# Patient Record
Sex: FEMALE | Race: WHITE | NOT HISPANIC OR LATINO
[De-identification: names, ages, dates, MRNs, and addresses within clinical notes are randomized per-mention and may not be internally consistent; named-entity substitution may affect disease eponyms.]

---

## 2017-11-27 ENCOUNTER — TRANSCRIPTION ENCOUNTER (OUTPATIENT)
Age: 27
End: 2017-11-27

## 2020-01-29 ENCOUNTER — TRANSCRIPTION ENCOUNTER (OUTPATIENT)
Age: 30
End: 2020-01-29

## 2021-03-23 ENCOUNTER — APPOINTMENT (OUTPATIENT)
Dept: NEPHROLOGY | Facility: CLINIC | Age: 31
End: 2021-03-23
Payer: COMMERCIAL

## 2021-03-23 ENCOUNTER — NON-APPOINTMENT (OUTPATIENT)
Age: 31
End: 2021-03-23

## 2021-03-23 ENCOUNTER — TRANSCRIPTION ENCOUNTER (OUTPATIENT)
Age: 31
End: 2021-03-23

## 2021-03-23 ENCOUNTER — LABORATORY RESULT (OUTPATIENT)
Age: 31
End: 2021-03-23

## 2021-03-23 VITALS — HEART RATE: 69 BPM | SYSTOLIC BLOOD PRESSURE: 111 MMHG | DIASTOLIC BLOOD PRESSURE: 76 MMHG

## 2021-03-23 VITALS — TEMPERATURE: 99.8 F | WEIGHT: 140 LBS

## 2021-03-23 DIAGNOSIS — F17.200 NICOTINE DEPENDENCE, UNSPECIFIED, UNCOMPLICATED: ICD-10-CM

## 2021-03-23 DIAGNOSIS — E04.1 NONTOXIC SINGLE THYROID NODULE: ICD-10-CM

## 2021-03-23 DIAGNOSIS — Z00.00 ENCOUNTER FOR GENERAL ADULT MEDICAL EXAMINATION W/OUT ABNORMAL FINDINGS: ICD-10-CM

## 2021-03-23 PROCEDURE — 99204 OFFICE O/P NEW MOD 45 MIN: CPT

## 2021-03-23 PROCEDURE — 99072 ADDL SUPL MATRL&STAF TM PHE: CPT

## 2021-03-23 NOTE — PHYSICAL EXAM
[General Appearance - Alert] : alert [General Appearance - In No Acute Distress] : in no acute distress [Outer Ear] : the ears and nose were normal in appearance [Oropharynx] : the oropharynx was normal [Auscultation Breath Sounds / Voice Sounds] : lungs were clear to auscultation bilaterally [Heart Rate And Rhythm] : heart rate was normal and rhythm regular [Heart Sounds] : normal S1 and S2 [Heart Sounds Gallop] : no gallops [Murmurs] : no murmurs [Heart Sounds Pericardial Friction Rub] : no pericardial rub [Edema] : there was no peripheral edema [Bowel Sounds] : normal bowel sounds [Abdomen Soft] : soft [Abdomen Tenderness] : non-tender [] : no hepato-splenomegaly [Abdomen Mass (___ Cm)] : no abdominal mass palpated [Cervical Lymph Nodes Enlarged Posterior Bilaterally] : posterior cervical [Cervical Lymph Nodes Enlarged Anterior Bilaterally] : anterior cervical [Supraclavicular Lymph Nodes Enlarged Bilaterally] : supraclavicular
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
## 2021-03-23 NOTE — ASSESSMENT
[FreeTextEntry1] : # Hematuria and proteinuria with FH of AD Alport syndrome.\par * Recheck U/A, urine protein.\par * [employer] progressive renal disease panel (18 genes) ordered. Written informed consent obtained ([email or web address]).\par * Follow up in 4 months.\par \par # Smoking.\par * Quit smoking. Counseling given.

## 2021-03-27 LAB
APPEARANCE: CLEAR
BILIRUBIN URINE: NEGATIVE
BLOOD URINE: ABNORMAL
COLOR: COLORLESS
CREAT SPEC-SCNC: 29 MG/DL
CREAT SPEC-SCNC: 29 MG/DL
CREAT/PROT UR: 0.4 RATIO
GLUCOSE QUALITATIVE U: NEGATIVE
KETONES URINE: NEGATIVE
LEUKOCYTE ESTERASE URINE: NEGATIVE
MICROALBUMIN 24H UR DL<=1MG/L-MCNC: 3.9 MG/DL
MICROALBUMIN/CREAT 24H UR-RTO: 136 MG/G
NITRITE URINE: NEGATIVE
PH URINE: 7.5
PROT UR-MCNC: 12 MG/DL
PROTEIN URINE: NEGATIVE
SPECIFIC GRAVITY URINE: 1
UROBILINOGEN URINE: NORMAL

## 2021-05-19 ENCOUNTER — LABORATORY RESULT (OUTPATIENT)
Age: 31
End: 2021-05-19

## 2021-05-19 ENCOUNTER — APPOINTMENT (OUTPATIENT)
Dept: NEPHROLOGY | Facility: CLINIC | Age: 31
End: 2021-05-19
Payer: COMMERCIAL

## 2021-05-19 VITALS — SYSTOLIC BLOOD PRESSURE: 121 MMHG | DIASTOLIC BLOOD PRESSURE: 81 MMHG | HEART RATE: 68 BPM

## 2021-05-19 DIAGNOSIS — R31.9 HEMATURIA, UNSPECIFIED: ICD-10-CM

## 2021-05-19 PROCEDURE — 36415 COLL VENOUS BLD VENIPUNCTURE: CPT

## 2021-05-19 PROCEDURE — 99214 OFFICE O/P EST MOD 30 MIN: CPT | Mod: 25

## 2021-05-19 PROCEDURE — 99072 ADDL SUPL MATRL&STAF TM PHE: CPT
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
## 2021-05-19 NOTE — ASSESSMENT
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 \par * Therapies for kidney disease: blood pressure control; other evidence-based therapies including exercise, a plant-based lower oxalate diet, and 400 mcg folic acid daily\par * Cardiovascular disease prevention: counseling on healthy diet, physical activity, weight loss, alcohol limitation, blood pressure control\par * The patient has been counseled that chronic kidney disease is a significant condition and regular office followup with me (at least every 6 months for now) is important for monitoring and their health, and that it is their responsibility to make a follow up appointment.\par * The patient has been counseled never to stop taking their medications without discussing it with me or another doctor.\par * The patient has been counseled on avoiding NSAIDs.\par * The patient has been counseled on risk of acute renal failure and instructed to immediately call and speak with me or go immediately to ER with any severe symptoms, nausea, vomiting, diarrhea, chest pain, or shortness of breath.\par * A counseling information sheet has been given (today or previously). All their questions were answered.\par \par # Albuminuria.\par * Recheck urine protein. \par \par # Borderline BP. \par * The patient's blood pressure was checked with the [employer] HEM-907XL using the SPRINT trial protocol after sitting quietly in an empty room with arm supported, back supported, and feet on the floor for 5 minutes. The average of 3 readings were taken.\par * A counseling information sheet has been given (currently or previously, in-person or electronically). All their questions were answered.\par * The patient has been counseled to check their BP at home with an automatic arm cuff, write down the readings, and reach me directly on the phone immediately if they are persistently > 180 systolic or if SBP is less than 100 or if lightheadedness develops. They were counseled to bring in all blood pressure readings and medications next visit.\par * The patient has been counseled that regular office followup (at least every 4 - 6 months for now)  is important for monitoring and for their health, and that it is their responsibility to make follow up appointments.\par * The patient also has been counseled that they must never stop or change any medications without discussing this with me (or another physician). \par

## 2021-05-22 LAB
25(OH)D3 SERPL-MCNC: 17.2 NG/ML
ALBUMIN SERPL ELPH-MCNC: 5.2 G/DL
ALP BLD-CCNC: 43 U/L
ALT SERPL-CCNC: 8 U/L
ANION GAP SERPL CALC-SCNC: 15 MMOL/L
APPEARANCE: CLEAR
AST SERPL-CCNC: 18 U/L
BASOPHILS # BLD AUTO: 0.03 K/UL
BASOPHILS NFR BLD AUTO: 0.4 %
BILIRUB SERPL-MCNC: 0.3 MG/DL
BILIRUBIN URINE: NEGATIVE
BLOOD URINE: ABNORMAL
BUN SERPL-MCNC: 8 MG/DL
C TRACH RRNA SPEC QL NAA+PROBE: NOT DETECTED
CALCIUM SERPL-MCNC: 9.9 MG/DL
CHLORIDE SERPL-SCNC: 102 MMOL/L
CHOLEST SERPL-MCNC: 243 MG/DL
CO2 SERPL-SCNC: 22 MMOL/L
COLOR: COLORLESS
CREAT SERPL-MCNC: 0.52 MG/DL
CREAT SPEC-SCNC: 22 MG/DL
CYSTATIN C SERPL-MCNC: 0.72 MG/L
EOSINOPHIL # BLD AUTO: 0.07 K/UL
EOSINOPHIL NFR BLD AUTO: 0.8 %
ESTIMATED AVERAGE GLUCOSE: 105 MG/DL
FERRITIN SERPL-MCNC: 102 NG/ML
GFR/BSA.PRED SERPLBLD CYS-BASED-ARV: 117 ML/MIN
GLUCOSE QUALITATIVE U: NEGATIVE
GLUCOSE SERPL-MCNC: 83 MG/DL
HBA1C MFR BLD HPLC: 5.3 %
HBV SURFACE AG SER QL: NONREACTIVE
HCT VFR BLD CALC: 38.3 %
HCV AB SER QL: NONREACTIVE
HCV S/CO RATIO: 0.13 S/CO
HDLC SERPL-MCNC: 82 MG/DL
HGB BLD-MCNC: 12.3 G/DL
HIV1+2 AB SPEC QL IA.RAPID: NONREACTIVE
IMM GRANULOCYTES NFR BLD AUTO: 0.2 %
KETONES URINE: NEGATIVE
LDLC SERPL CALC-MCNC: 141 MG/DL
LEUKOCYTE ESTERASE URINE: NEGATIVE
LYMPHOCYTES # BLD AUTO: 3.35 K/UL
LYMPHOCYTES NFR BLD AUTO: 40.4 %
M TB IFN-G BLD-IMP: NEGATIVE
MAN DIFF?: NORMAL
MCHC RBC-ENTMCNC: 29.4 PG
MCHC RBC-ENTMCNC: 32.1 GM/DL
MCV RBC AUTO: 91.6 FL
MICROALBUMIN 24H UR DL<=1MG/L-MCNC: 3.9 MG/DL
MICROALBUMIN/CREAT 24H UR-RTO: 179 MG/G
MONOCYTES # BLD AUTO: 0.83 K/UL
MONOCYTES NFR BLD AUTO: 10 %
N GONORRHOEA RRNA SPEC QL NAA+PROBE: NOT DETECTED
NEUTROPHILS # BLD AUTO: 4 K/UL
NEUTROPHILS NFR BLD AUTO: 48.2 %
NITRITE URINE: NEGATIVE
NONHDLC SERPL-MCNC: 161 MG/DL
PH URINE: 6.5
PLATELET # BLD AUTO: 280 K/UL
POTASSIUM SERPL-SCNC: 3.9 MMOL/L
PROT SERPL-MCNC: 7.6 G/DL
PROTEIN URINE: NEGATIVE
QUANTIFERON TB PLUS MITOGEN MINUS NIL: 9.5 IU/ML
QUANTIFERON TB PLUS NIL: 0.05 IU/ML
QUANTIFERON TB PLUS TB1 MINUS NIL: 0 IU/ML
QUANTIFERON TB PLUS TB2 MINUS NIL: 0 IU/ML
RBC # BLD: 4.18 M/UL
RBC # FLD: 12.8 %
SODIUM SERPL-SCNC: 139 MMOL/L
SOURCE AMPLIFICATION: NORMAL
SPECIFIC GRAVITY URINE: 1
T PALLIDUM AB SER QL IA: NEGATIVE
THYROPEROXIDASE AB SERPL IA-ACNC: 1593 IU/ML
TRIGL SERPL-MCNC: 98 MG/DL
TSH SERPL-ACNC: 0.76 UIU/ML
UROBILINOGEN URINE: NORMAL
VIT B12 SERPL-MCNC: 499 PG/ML
WBC # FLD AUTO: 8.3 K/UL

## 2021-06-18 ENCOUNTER — APPOINTMENT (OUTPATIENT)
Dept: NEPHROLOGY | Facility: CLINIC | Age: 31
End: 2021-06-18

## 2024-03-06 ENCOUNTER — APPOINTMENT (OUTPATIENT)
Dept: NEPHROLOGY | Facility: CLINIC | Age: 34
End: 2024-03-06
Payer: COMMERCIAL

## 2024-03-06 VITALS — WEIGHT: 152 LBS

## 2024-03-06 VITALS — SYSTOLIC BLOOD PRESSURE: 103 MMHG | HEART RATE: 78 BPM | DIASTOLIC BLOOD PRESSURE: 71 MMHG

## 2024-03-06 DIAGNOSIS — E06.9 THYROIDITIS, UNSPECIFIED: ICD-10-CM

## 2024-03-06 DIAGNOSIS — Z3A.15 15 WEEKS GESTATION OF PREGNANCY: ICD-10-CM

## 2024-03-06 DIAGNOSIS — R03.0 ELEVATED BLOOD-PRESSURE READING, W/OUT DIAGNOSIS OF HYPERTENSION: ICD-10-CM

## 2024-03-06 DIAGNOSIS — R80.9 PROTEINURIA, UNSPECIFIED: ICD-10-CM

## 2024-03-06 PROCEDURE — 36415 COLL VENOUS BLD VENIPUNCTURE: CPT

## 2024-03-06 PROCEDURE — 99214 OFFICE O/P EST MOD 30 MIN: CPT | Mod: 25
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
## 2024-03-06 NOTE — ASSESSMENT
[FreeTextEntry1] : # 15 weeks gestation. * High risk ob referral made and recommended. They were instructed that this referral is important for their health and that it is their responsibility to make and keep this appointment. All their questions were answered.   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 * Reheck labs. * Will consider SGLT2i in future.  * Therapies for kidney disease: blood pressure control; other evidence-based therapies recommended including exercise, a plant-based lower oxalate diet, and 400 mcg folic acid daily * Cardiovascular disease prevention: counseling on healthy diet, physical activity, weight loss, alcohol limitation, blood pressure control * A counseling information sheet on CKD has been given (which they have been instructed to read). * The patient has been counseled that chronic kidney disease is a significant condition and regular office follow-up with me (at least every 1-2  months for now) is important for monitoring and their health, and that it is their responsibility to make a follow-up appointment. * The patient has been counseled never to stop taking their medications without discussing it with me or another doctor. * The patient has been counseled on avoiding NSAIDs. * The patient has been counseled on risk of worsening kidney function and instructed to immediately call and speak with me and go immediately to ER with any severe symptoms, nausea, vomiting, diarrhea, chest pain, or shortness of breath.  # Albuminuria. * Recheck urine protein.   # Borderline BP now controlled.  * Couseled to check BP and to immediately contact me/ob and go to ER with any sx of preeclampsia, elevated BP > 130 - 140s, HA, blurred vision, nausea, etc.  * The patient's blood pressure was checked with the [employer] HEM-907XL using the SPRINT trial protocol after sitting quietly in an empty room with arm supported, back supported, and feet on the floor for 5 minutes. The average of 3 readings were taken. * A counseling information sheet has been given (currently or previously, in-person or electronically). All their questions were answered. * The patient has been counseled to check their BP at home with an automatic arm cuff, write down the readings, and reach me directly on the phone immediately if they are persistently > 180 systolic or if SBP is less than 100 or if lightheadedness develops. They were counseled to bring in all blood pressure readings and medications next visit. * The patient has been counseled that regular office followup (at least every 4 - 6 months for now)  is important for monitoring and for their health, and that it is their responsibility to make follow up appointments. * The patient also has been counseled that they must never stop or change any medications without discussing this with me (or another physician).

## 2024-03-08 ENCOUNTER — APPOINTMENT (OUTPATIENT)
Dept: OBGYN | Facility: CLINIC | Age: 34
End: 2024-03-08
Payer: COMMERCIAL

## 2024-03-08 VITALS
SYSTOLIC BLOOD PRESSURE: 127 MMHG | HEART RATE: 81 BPM | OXYGEN SATURATION: 99 % | DIASTOLIC BLOOD PRESSURE: 82 MMHG | WEIGHT: 152 LBS

## 2024-03-08 DIAGNOSIS — Z87.59 PERSONAL HISTORY OF OTHER COMPLICATIONS OF PREGNANCY, CHILDBIRTH AND THE PUERPERIUM: ICD-10-CM

## 2024-03-08 DIAGNOSIS — Q87.81 ALPORT SYNDROME: ICD-10-CM

## 2024-03-08 DIAGNOSIS — Z13.79 ENCOUNTER FOR OTHER SCREENING FOR GENETIC AND CHROMOSOMAL ANOMALIES: ICD-10-CM

## 2024-03-08 DIAGNOSIS — Z86.39 PERSONAL HISTORY OF OTHER ENDOCRINE, NUTRITIONAL AND METABOLIC DISEASE: ICD-10-CM

## 2024-03-08 DIAGNOSIS — Z87.891 PERSONAL HISTORY OF NICOTINE DEPENDENCE: ICD-10-CM

## 2024-03-08 PROCEDURE — 99204 OFFICE O/P NEW MOD 45 MIN: CPT

## 2024-03-08 NOTE — PLAN
[FreeTextEntry1] : ob at 15 weeks; CKD stage 1 sees dr Sheets will confirm asa ok  hashimotots-no meds; last TSH 0.8 pt wants to see if ab's lower today elevated NT? unclear exact measurement- 2.8mm but was told initially 3.3mm genetics; pt wants amnio will do early tyrell- rtn in 3 wks

## 2024-03-08 NOTE — HISTORY OF PRESENT ILLNESS
[FreeTextEntry1] : Myrna Hartman presents for continuation of prenatal care. currently 15 wks  LMP:11/25/2023 Dr Hager gyn had NT and was elevated but repeat normal? more headaches recently-hydration, caffeine, tylenol. pt works in sales and travels frequently. questions re flying

## 2024-03-08 NOTE — PHYSICAL EXAM
[Chaperone Present] : A chaperone was present in the examining room during all aspects of the physical examination [Alert] : alert [Appropriately responsive] : appropriately responsive [No Acute Distress] : no acute distress [No Lymphadenopathy] : no lymphadenopathy [No Murmurs] : no murmurs [Soft] : soft [No HSM] : No HSM [Non-distended] : non-distended [Non-tender] : non-tender [No Mass] : no mass [No Lesions] : no lesions [Oriented x3] : oriented x3 [Examination Of The Breasts] : a normal appearance [No Masses] : no breast masses were palpable [Labia Majora] : normal [Labia Minora] : normal [Normal] : normal [Uterine Adnexae] : normal

## 2024-03-11 ENCOUNTER — APPOINTMENT (OUTPATIENT)
Dept: MATERNAL FETAL MEDICINE | Facility: CLINIC | Age: 34
End: 2024-03-11
Payer: COMMERCIAL

## 2024-03-11 PROCEDURE — 99205 OFFICE O/P NEW HI 60 MIN: CPT | Mod: 95

## 2024-03-12 LAB
CMV IGG SERPL QL: 7.4 U/ML
CMV IGG SERPL-IMP: POSITIVE
CMV IGM SERPL QL: <8 AU/ML
CMV IGM SERPL QL: NEGATIVE
LEAD BLD-MCNC: <1 UG/DL
THYROGLOB AB SERPL-ACNC: <20 IU/ML
THYROPEROXIDASE AB SERPL IA-ACNC: 1567 IU/ML

## 2024-03-12 NOTE — HISTORY OF PRESENT ILLNESS
[Home] : at home, [unfilled] , at the time of the visit. [Medical Office: ([facility])___] : at the medical office located in  [Verbal consent obtained from patient] : the patient, [unfilled] [FreeTextEntry1] :  Ms. Hartman is a 34 yo  at 15 2/7 wga (LMP 23) presents for [facility] consultation.  The patient's self reported ethnicity is white. She works for a [employer] company.   Her OB history is as follows: G1: molar pregnancy, D&C G2: current   Her medical history is notable for COL4A4 AD Alport. She has baseline proteinuria (500 mg) and her Cr is 0.42. She reports no history of HTN. She performs home BP monitoring and reports systolic values of 100-110. Her mother also has a history of AD Alport syndrome. She is followed by nephrology and is s/p a renal ultrasound.   She has smoked intermittently 1 PPD for 15 years however has discontinued smoking during pregnancy.

## 2024-03-12 NOTE — DISCUSSION/SUMMARY
[FreeTextEntry1] :  Alport Syndrome, Stage 1 CKD: Normal GFR or stage 1-2 CKD are at an increased the risk for some adverse fetal outcomes ( birth, SGA, need for  intensive care) but not intrauterine fetal death, new onset hypertension, new/doubling proteinuria. In CKD 1 or mild renal impairment (Cr<1.4mg/d L), pregnancy does not seem to lead to long-term worsening of renal function.  - The patient is considering amniocentesis. Her concerns stem from a NT (performed by her aunt at an outside facility, with reported NT thickness of 3.3-3.7). Of note, she reports a more formal NT with measurement of 2.7. She does not desire amniocentesis for information solely based on the potential inheritance of Alport. We reviewed the genetics of an AD condition.  - The patient will continue home BP monitoring. We reviewed the diagnostic criteria for HTN and the signs/symptoms of pre-eclampsia. We reviewed the maternal and fetal risks associated with the HTN spectrum of pregnancy. - NSAIDS are to be avoided  - Continued smoking cessation encouraged  - In the event that the patient develops severe pre-eclampsia, MgSO4 should be used judicially (consider skipping loading dose or reduced dosing).

## 2024-03-15 LAB
25(OH)D3 SERPL-MCNC: 36.1 NG/ML
ALBUMIN SERPL ELPH-MCNC: 4.2 G/DL
ALP BLD-CCNC: 37 U/L
ALT SERPL-CCNC: 9 U/L
ANION GAP SERPL CALC-SCNC: 13 MMOL/L
AST SERPL-CCNC: 20 U/L
BASOPHILS # BLD AUTO: 0.06 K/UL
BASOPHILS NFR BLD AUTO: 0.5 %
BILIRUB SERPL-MCNC: <0.2 MG/DL
BUN SERPL-MCNC: 8 MG/DL
CALCIUM SERPL-MCNC: 9.5 MG/DL
CHLORIDE SERPL-SCNC: 102 MMOL/L
CO2 SERPL-SCNC: 18 MMOL/L
CREAT SERPL-MCNC: 0.43 MG/DL
CREAT SPEC-SCNC: 30 MG/DL
EGFR: 133 ML/MIN/1.73M2
EOSINOPHIL # BLD AUTO: 0.16 K/UL
EOSINOPHIL NFR BLD AUTO: 1.3 %
ESTIMATED AVERAGE GLUCOSE: 111 MG/DL
GLUCOSE SERPL-MCNC: 86 MG/DL
HBA1C MFR BLD HPLC: 5.5 %
HCT VFR BLD CALC: 33.8 %
HGB BLD-MCNC: 11.2 G/DL
IMM GRANULOCYTES NFR BLD AUTO: 0.5 %
LYMPHOCYTES # BLD AUTO: 1.88 K/UL
LYMPHOCYTES NFR BLD AUTO: 15.2 %
MAN DIFF?: NORMAL
MCHC RBC-ENTMCNC: 29.6 PG
MCHC RBC-ENTMCNC: 33.1 GM/DL
MCV RBC AUTO: 89.2 FL
MICROALBUMIN 24H UR DL<=1MG/L-MCNC: 10.6 MG/DL
MICROALBUMIN/CREAT 24H UR-RTO: 352 MG/G
MONOCYTES # BLD AUTO: 0.9 K/UL
MONOCYTES NFR BLD AUTO: 7.3 %
NEUTROPHILS # BLD AUTO: 9.33 K/UL
NEUTROPHILS NFR BLD AUTO: 75.2 %
PLATELET # BLD AUTO: 282 K/UL
POTASSIUM SERPL-SCNC: 4.3 MMOL/L
PROT SERPL-MCNC: 6.7 G/DL
RBC # BLD: 3.79 M/UL
RBC # FLD: 13.1 %
SODIUM SERPL-SCNC: 133 MMOL/L
TSH SERPL-ACNC: 0.8 UIU/ML
VIT B12 SERPL-MCNC: 650 PG/ML
WBC # FLD AUTO: 12.39 K/UL

## 2024-03-27 ENCOUNTER — APPOINTMENT (OUTPATIENT)
Dept: ANTEPARTUM | Facility: CLINIC | Age: 34
End: 2024-03-27
Payer: COMMERCIAL

## 2024-03-27 ENCOUNTER — ASOB RESULT (OUTPATIENT)
Age: 34
End: 2024-03-27

## 2024-03-27 ENCOUNTER — APPOINTMENT (OUTPATIENT)
Dept: OBGYN | Facility: CLINIC | Age: 34
End: 2024-03-27
Payer: COMMERCIAL

## 2024-03-27 VITALS
BODY MASS INDEX: 29.08 KG/M2 | OXYGEN SATURATION: 97 % | HEART RATE: 82 BPM | DIASTOLIC BLOOD PRESSURE: 69 MMHG | WEIGHT: 158 LBS | SYSTOLIC BLOOD PRESSURE: 110 MMHG | HEIGHT: 62 IN

## 2024-03-27 PROCEDURE — 76817 TRANSVAGINAL US OBSTETRIC: CPT

## 2024-03-27 PROCEDURE — 76805 OB US >/= 14 WKS SNGL FETUS: CPT | Mod: 59

## 2024-03-27 PROCEDURE — 0502F SUBSEQUENT PRENATAL CARE: CPT

## 2024-03-31 LAB
FERRITIN SERPL-MCNC: 97 NG/ML
FOLATE SERPL-MCNC: >20 NG/ML
IRON SATN MFR SERPL: 33 %
IRON SERPL-MCNC: 105 UG/DL
TIBC SERPL-MCNC: 314 UG/DL
UIBC SERPL-MCNC: 209 UG/DL
VIT B12 SERPL-MCNC: 608 PG/ML

## 2024-04-01 ENCOUNTER — APPOINTMENT (OUTPATIENT)
Dept: ANTEPARTUM | Facility: CLINIC | Age: 34
End: 2024-04-01
Payer: COMMERCIAL

## 2024-04-01 ENCOUNTER — ASOB RESULT (OUTPATIENT)
Age: 34
End: 2024-04-01

## 2024-04-01 PROCEDURE — 76946 ECHO GUIDE FOR AMNIOCENTESIS: CPT

## 2024-04-01 PROCEDURE — 59000 AMNIOCENTESIS DIAGNOSTIC: CPT

## 2024-04-09 LAB
AFP MOM: 1.02
AFP VALUE: 41.4 NG/ML
ALPHA FETOPROTEIN SERUM COMMENT: NORMAL
ALPHA FETOPROTEIN SERUM INTERPRETATION: NORMAL
ALPHA FETOPROTEIN SERUM RESULTS: NORMAL
ALPHA FETOPROTEIN SERUM TEST RESULTS: NORMAL
GESTATIONAL AGE BASED ON: NORMAL
GESTATIONAL AGE ON COLLECTION DATE: 17.6 WEEKS
INSULIN DEP DIABETES: NO
MATERNAL AGE AT EDD AFP: 32.2 YR
MULTIPLE GESTATION: NO
OSBR RISK 1 IN: NORMAL
RACE: NORMAL
WEIGHT AFP: 158 LBS

## 2024-04-16 ENCOUNTER — NON-APPOINTMENT (OUTPATIENT)
Age: 34
End: 2024-04-16

## 2024-04-17 ENCOUNTER — ASOB RESULT (OUTPATIENT)
Age: 34
End: 2024-04-17

## 2024-04-17 ENCOUNTER — APPOINTMENT (OUTPATIENT)
Dept: ANTEPARTUM | Facility: CLINIC | Age: 34
End: 2024-04-17
Payer: COMMERCIAL

## 2024-04-17 PROCEDURE — 76816 OB US FOLLOW-UP PER FETUS: CPT

## 2024-04-17 PROCEDURE — 76817 TRANSVAGINAL US OBSTETRIC: CPT

## 2024-04-19 ENCOUNTER — APPOINTMENT (OUTPATIENT)
Dept: OBGYN | Facility: CLINIC | Age: 34
End: 2024-04-19
Payer: COMMERCIAL

## 2024-04-19 VITALS
HEART RATE: 93 BPM | OXYGEN SATURATION: 97 % | DIASTOLIC BLOOD PRESSURE: 74 MMHG | SYSTOLIC BLOOD PRESSURE: 124 MMHG | BODY MASS INDEX: 28.71 KG/M2 | WEIGHT: 156 LBS | HEIGHT: 62 IN

## 2024-04-19 PROCEDURE — 0502F SUBSEQUENT PRENATAL CARE: CPT

## 2024-04-22 ENCOUNTER — APPOINTMENT (OUTPATIENT)
Dept: PEDIATRIC CARDIOLOGY | Facility: CLINIC | Age: 34
End: 2024-04-22
Payer: COMMERCIAL

## 2024-04-22 PROCEDURE — 76827 ECHO EXAM OF FETAL HEART: CPT

## 2024-04-22 PROCEDURE — 99203 OFFICE O/P NEW LOW 30 MIN: CPT | Mod: 25

## 2024-04-22 PROCEDURE — 76821 MIDDLE CEREBRAL ARTERY ECHO: CPT

## 2024-04-22 PROCEDURE — 76825 ECHO EXAM OF FETAL HEART: CPT

## 2024-04-22 PROCEDURE — 76820 UMBILICAL ARTERY ECHO: CPT

## 2024-04-22 PROCEDURE — 93325 DOPPLER ECHO COLOR FLOW MAPG: CPT | Mod: 59

## 2024-05-09 ENCOUNTER — APPOINTMENT (OUTPATIENT)
Dept: OBGYN | Facility: CLINIC | Age: 34
End: 2024-05-09
Payer: COMMERCIAL

## 2024-05-09 DIAGNOSIS — R10.2 PELVIC AND PERINEAL PAIN: ICD-10-CM

## 2024-05-09 PROCEDURE — 0502F SUBSEQUENT PRENATAL CARE: CPT

## 2024-05-22 ENCOUNTER — APPOINTMENT (OUTPATIENT)
Dept: OBGYN | Facility: CLINIC | Age: 34
End: 2024-05-22
Payer: COMMERCIAL

## 2024-05-22 ENCOUNTER — ASOB RESULT (OUTPATIENT)
Age: 34
End: 2024-05-22

## 2024-05-22 ENCOUNTER — APPOINTMENT (OUTPATIENT)
Dept: ANTEPARTUM | Facility: CLINIC | Age: 34
End: 2024-05-22

## 2024-05-22 VITALS
DIASTOLIC BLOOD PRESSURE: 63 MMHG | WEIGHT: 160 LBS | SYSTOLIC BLOOD PRESSURE: 102 MMHG | OXYGEN SATURATION: 96 % | BODY MASS INDEX: 29.44 KG/M2 | HEART RATE: 74 BPM | HEIGHT: 62 IN

## 2024-05-22 DIAGNOSIS — O09.90 SUPERVISION OF HIGH RISK PREGNANCY, UNSPECIFIED, UNSPECIFIED TRIMESTER: ICD-10-CM

## 2024-05-22 PROCEDURE — 36415 COLL VENOUS BLD VENIPUNCTURE: CPT

## 2024-05-22 PROCEDURE — 0502F SUBSEQUENT PRENATAL CARE: CPT

## 2024-05-22 PROCEDURE — 76816 OB US FOLLOW-UP PER FETUS: CPT

## 2024-05-22 PROCEDURE — 76820 UMBILICAL ARTERY ECHO: CPT | Mod: 59

## 2024-05-23 ENCOUNTER — TRANSCRIPTION ENCOUNTER (OUTPATIENT)
Age: 34
End: 2024-05-23

## 2024-05-23 DIAGNOSIS — O99.810 ABNORMAL GLUCOSE COMPLICATING PREGNANCY: ICD-10-CM

## 2024-05-23 LAB
BACTERIA UR CULT: NORMAL
BASOPHILS # BLD AUTO: 0.06 K/UL
BASOPHILS NFR BLD AUTO: 0.5 %
EOSINOPHIL # BLD AUTO: 0.08 K/UL
EOSINOPHIL NFR BLD AUTO: 0.7 %
GLUCOSE 1H P 50 G GLC PO SERPL-MCNC: 135 MG/DL
HCT VFR BLD CALC: 31.4 %
HGB BLD-MCNC: 10.3 G/DL
IMM GRANULOCYTES NFR BLD AUTO: 0.8 %
LYMPHOCYTES # BLD AUTO: 1.59 K/UL
LYMPHOCYTES NFR BLD AUTO: 13.2 %
MAN DIFF?: NORMAL
MCHC RBC-ENTMCNC: 30.2 PG
MCHC RBC-ENTMCNC: 32.8 GM/DL
MCV RBC AUTO: 92.1 FL
MONOCYTES # BLD AUTO: 0.79 K/UL
MONOCYTES NFR BLD AUTO: 6.6 %
NEUTROPHILS # BLD AUTO: 9.43 K/UL
NEUTROPHILS NFR BLD AUTO: 78.2 %
PLATELET # BLD AUTO: 267 K/UL
RBC # BLD: 3.41 M/UL
RBC # FLD: 13.7 %
T PALLIDUM AB SER QL IA: NEGATIVE
TSH SERPL-ACNC: 0.6 UIU/ML
WBC # FLD AUTO: 12.05 K/UL

## 2024-05-30 ENCOUNTER — TRANSCRIPTION ENCOUNTER (OUTPATIENT)
Age: 34
End: 2024-05-30

## 2024-05-30 LAB — BACTERIA UR CULT: NORMAL

## 2024-06-04 ENCOUNTER — NON-APPOINTMENT (OUTPATIENT)
Age: 34
End: 2024-06-04

## 2024-06-05 ENCOUNTER — APPOINTMENT (OUTPATIENT)
Dept: NEPHROLOGY | Facility: CLINIC | Age: 34
End: 2024-06-05

## 2024-06-12 ENCOUNTER — APPOINTMENT (OUTPATIENT)
Dept: OBGYN | Facility: CLINIC | Age: 34
End: 2024-06-12
Payer: COMMERCIAL

## 2024-06-12 VITALS
DIASTOLIC BLOOD PRESSURE: 74 MMHG | SYSTOLIC BLOOD PRESSURE: 121 MMHG | BODY MASS INDEX: 29.81 KG/M2 | OXYGEN SATURATION: 98 % | WEIGHT: 163 LBS | HEART RATE: 76 BPM

## 2024-06-12 PROCEDURE — 0502F SUBSEQUENT PRENATAL CARE: CPT

## 2024-06-26 ENCOUNTER — ASOB RESULT (OUTPATIENT)
Age: 34
End: 2024-06-26

## 2024-06-26 ENCOUNTER — NON-APPOINTMENT (OUTPATIENT)
Age: 34
End: 2024-06-26

## 2024-06-26 ENCOUNTER — APPOINTMENT (OUTPATIENT)
Dept: OBGYN | Facility: CLINIC | Age: 34
End: 2024-06-26
Payer: COMMERCIAL

## 2024-06-26 ENCOUNTER — APPOINTMENT (OUTPATIENT)
Dept: ANTEPARTUM | Facility: CLINIC | Age: 34
End: 2024-06-26

## 2024-06-26 VITALS
SYSTOLIC BLOOD PRESSURE: 118 MMHG | HEART RATE: 81 BPM | WEIGHT: 164 LBS | BODY MASS INDEX: 30 KG/M2 | DIASTOLIC BLOOD PRESSURE: 66 MMHG | OXYGEN SATURATION: 98 %

## 2024-06-26 PROCEDURE — 36415 COLL VENOUS BLD VENIPUNCTURE: CPT

## 2024-06-26 PROCEDURE — 76819 FETAL BIOPHYS PROFIL W/O NST: CPT

## 2024-06-26 PROCEDURE — 76820 UMBILICAL ARTERY ECHO: CPT | Mod: 59

## 2024-06-26 PROCEDURE — 76817 TRANSVAGINAL US OBSTETRIC: CPT

## 2024-06-26 PROCEDURE — 76816 OB US FOLLOW-UP PER FETUS: CPT

## 2024-06-26 PROCEDURE — 0502F SUBSEQUENT PRENATAL CARE: CPT

## 2024-06-27 LAB
BASOPHILS # BLD AUTO: 0.07 K/UL
BASOPHILS NFR BLD AUTO: 0.6 %
EOSINOPHIL # BLD AUTO: 0.09 K/UL
EOSINOPHIL NFR BLD AUTO: 0.7 %
HCT VFR BLD CALC: 33.6 %
HGB BLD-MCNC: 11 G/DL
IMM GRANULOCYTES NFR BLD AUTO: 1 %
LYMPHOCYTES # BLD AUTO: 1.7 K/UL
LYMPHOCYTES NFR BLD AUTO: 13.6 %
MAN DIFF?: NORMAL
MCHC RBC-ENTMCNC: 30.2 PG
MCHC RBC-ENTMCNC: 32.7 GM/DL
MCV RBC AUTO: 92.3 FL
MONOCYTES # BLD AUTO: 0.82 K/UL
MONOCYTES NFR BLD AUTO: 6.6 %
NEUTROPHILS # BLD AUTO: 9.69 K/UL
NEUTROPHILS NFR BLD AUTO: 77.5 %
PLATELET # BLD AUTO: 258 K/UL
RBC # BLD: 3.64 M/UL
RBC # FLD: 14 %
WBC # FLD AUTO: 12.5 K/UL

## 2024-07-08 ENCOUNTER — NON-APPOINTMENT (OUTPATIENT)
Age: 34
End: 2024-07-08

## 2024-07-17 ENCOUNTER — APPOINTMENT (OUTPATIENT)
Dept: OBGYN | Facility: CLINIC | Age: 34
End: 2024-07-17
Payer: COMMERCIAL

## 2024-07-17 VITALS
SYSTOLIC BLOOD PRESSURE: 113 MMHG | DIASTOLIC BLOOD PRESSURE: 74 MMHG | OXYGEN SATURATION: 98 % | HEIGHT: 62 IN | WEIGHT: 165 LBS | HEART RATE: 74 BPM | BODY MASS INDEX: 30.36 KG/M2

## 2024-07-17 PROCEDURE — 0502F SUBSEQUENT PRENATAL CARE: CPT

## 2024-07-26 ENCOUNTER — APPOINTMENT (OUTPATIENT)
Dept: ANTEPARTUM | Facility: CLINIC | Age: 34
End: 2024-07-26

## 2024-07-26 ENCOUNTER — ASOB RESULT (OUTPATIENT)
Age: 34
End: 2024-07-26

## 2024-07-26 PROCEDURE — 76819 FETAL BIOPHYS PROFIL W/O NST: CPT

## 2024-07-26 PROCEDURE — 76820 UMBILICAL ARTERY ECHO: CPT | Mod: 59

## 2024-07-26 PROCEDURE — 76817 TRANSVAGINAL US OBSTETRIC: CPT

## 2024-07-26 PROCEDURE — 76816 OB US FOLLOW-UP PER FETUS: CPT

## 2024-08-05 ENCOUNTER — LABORATORY RESULT (OUTPATIENT)
Age: 34
End: 2024-08-05

## 2024-08-05 ENCOUNTER — APPOINTMENT (OUTPATIENT)
Dept: OBGYN | Facility: CLINIC | Age: 34
End: 2024-08-05

## 2024-08-05 PROCEDURE — 0502F SUBSEQUENT PRENATAL CARE: CPT

## 2024-08-05 PROCEDURE — 81002 URINALYSIS NONAUTO W/O SCOPE: CPT | Mod: NC

## 2024-08-06 ENCOUNTER — APPOINTMENT (OUTPATIENT)
Dept: MATERNAL FETAL MEDICINE | Facility: CLINIC | Age: 34
End: 2024-08-06

## 2024-08-06 PROCEDURE — 99215 OFFICE O/P EST HI 40 MIN: CPT | Mod: 95

## 2024-08-06 NOTE — HISTORY OF PRESENT ILLNESS
[Home] : at home, [unfilled] , at the time of the visit. [Medical Office: ([facility])___] : at the medical office located in  [Verbal consent obtained from patient] : the patient, [unfilled] [FreeTextEntry1] :  Ms. Hartman is a 32 yo  at 36 wga presents for M consultation. The patient's self reported ethnicity is white. She works for a [employer] company.  Her OB history is as follows: G1: molar pregnancy, D&C G2: current, breech presentation (US reviewed and breech presentation at 25, 30, and 34 wga), most recent EFW 20% with ELAINE 13  Her medical history is notable for COL4A4 AD Alport. She has baseline proteinuria (500 mg) and her Cr is 0.42. She reports no history of HTN. She performs home BP monitoring and reports systolic values of 100-110. Her mother also has a history of AD Alport syndrome. She is followed by nephrology and is s/p a renal ultrasound.  She has smoked intermittently 1 PPD for 15 years however has discontinued smoking during pregnancy.

## 2024-08-06 NOTE — DISCUSSION/SUMMARY
[FreeTextEntry1] :  Recommendations: We reviewed the risks, benefits, alternatives, and indications of ECV with an attempt for vaginal delivery versus primary . We also reviewed the role of regional anesthesia. The patient is aware of the risk for an urgent/emergent delivery. In addition, we reviewed the likelihood for success. The patient is considering ECV and will present for US on .

## 2024-08-09 ENCOUNTER — NON-APPOINTMENT (OUTPATIENT)
Age: 34
End: 2024-08-09

## 2024-08-09 ENCOUNTER — ASOB RESULT (OUTPATIENT)
Age: 34
End: 2024-08-09

## 2024-08-09 ENCOUNTER — APPOINTMENT (OUTPATIENT)
Dept: ANTEPARTUM | Facility: CLINIC | Age: 34
End: 2024-08-09

## 2024-08-09 PROCEDURE — 76818 FETAL BIOPHYS PROFILE W/NST: CPT | Mod: 59

## 2024-08-09 PROCEDURE — 76820 UMBILICAL ARTERY ECHO: CPT | Mod: 59

## 2024-08-09 PROCEDURE — 76816 OB US FOLLOW-UP PER FETUS: CPT

## 2024-08-14 ENCOUNTER — APPOINTMENT (OUTPATIENT)
Dept: ANTEPARTUM | Facility: CLINIC | Age: 34
End: 2024-08-14
Payer: COMMERCIAL

## 2024-08-14 ENCOUNTER — ASOB RESULT (OUTPATIENT)
Age: 34
End: 2024-08-14

## 2024-08-14 PROCEDURE — 76816 OB US FOLLOW-UP PER FETUS: CPT

## 2024-08-14 PROCEDURE — 76820 UMBILICAL ARTERY ECHO: CPT | Mod: 59

## 2024-08-14 PROCEDURE — 76818 FETAL BIOPHYS PROFILE W/NST: CPT | Mod: 59

## 2024-08-21 ENCOUNTER — APPOINTMENT (OUTPATIENT)
Dept: OBGYN | Facility: CLINIC | Age: 34
End: 2024-08-21
Payer: COMMERCIAL

## 2024-08-21 ENCOUNTER — APPOINTMENT (OUTPATIENT)
Dept: ANTEPARTUM | Facility: CLINIC | Age: 34
End: 2024-08-21
Payer: COMMERCIAL

## 2024-08-21 ENCOUNTER — ASOB RESULT (OUTPATIENT)
Age: 34
End: 2024-08-21

## 2024-08-21 VITALS
BODY MASS INDEX: 31.64 KG/M2 | DIASTOLIC BLOOD PRESSURE: 82 MMHG | WEIGHT: 173 LBS | OXYGEN SATURATION: 97 % | SYSTOLIC BLOOD PRESSURE: 121 MMHG | HEART RATE: 71 BPM

## 2024-08-21 PROCEDURE — 0502F SUBSEQUENT PRENATAL CARE: CPT

## 2024-08-21 PROCEDURE — 76816 OB US FOLLOW-UP PER FETUS: CPT

## 2024-08-21 PROCEDURE — 76821 MIDDLE CEREBRAL ARTERY ECHO: CPT | Mod: 59

## 2024-08-21 PROCEDURE — 76820 UMBILICAL ARTERY ECHO: CPT | Mod: 59

## 2024-08-21 PROCEDURE — 76818 FETAL BIOPHYS PROFILE W/NST: CPT | Mod: 59

## 2024-08-23 ENCOUNTER — TRANSCRIPTION ENCOUNTER (OUTPATIENT)
Age: 34
End: 2024-08-23

## 2024-08-24 ENCOUNTER — OUTPATIENT (OUTPATIENT)
Dept: OUTPATIENT SERVICES | Facility: HOSPITAL | Age: 34
LOS: 1 days | End: 2024-08-24
Payer: COMMERCIAL

## 2024-08-24 DIAGNOSIS — Z01.818 ENCOUNTER FOR OTHER PREPROCEDURAL EXAMINATION: ICD-10-CM

## 2024-08-24 LAB
ALBUMIN SERPL ELPH-MCNC: 3.5 G/DL — SIGNIFICANT CHANGE UP (ref 3.3–5)
ALP SERPL-CCNC: 127 U/L — HIGH (ref 40–120)
ALT FLD-CCNC: 7 U/L — LOW (ref 10–45)
ANION GAP SERPL CALC-SCNC: 10 MMOL/L — SIGNIFICANT CHANGE UP (ref 5–17)
AST SERPL-CCNC: 16 U/L — SIGNIFICANT CHANGE UP (ref 10–40)
BASOPHILS # BLD AUTO: 0.06 K/UL — SIGNIFICANT CHANGE UP (ref 0–0.2)
BASOPHILS NFR BLD AUTO: 0.4 % — SIGNIFICANT CHANGE UP (ref 0–2)
BILIRUB SERPL-MCNC: 0.3 MG/DL — SIGNIFICANT CHANGE UP (ref 0.2–1.2)
BLD GP AB SCN SERPL QL: NEGATIVE — SIGNIFICANT CHANGE UP
BLD GP AB SCN SERPL QL: NEGATIVE — SIGNIFICANT CHANGE UP
BUN SERPL-MCNC: 7 MG/DL — SIGNIFICANT CHANGE UP (ref 7–23)
CALCIUM SERPL-MCNC: 9.6 MG/DL — SIGNIFICANT CHANGE UP (ref 8.4–10.5)
CHLORIDE SERPL-SCNC: 104 MMOL/L — SIGNIFICANT CHANGE UP (ref 96–108)
CO2 SERPL-SCNC: 21 MMOL/L — LOW (ref 22–31)
CREAT SERPL-MCNC: 0.44 MG/DL — LOW (ref 0.5–1.3)
EGFR: 132 ML/MIN/1.73M2 — SIGNIFICANT CHANGE UP
EOSINOPHIL # BLD AUTO: 0.09 K/UL — SIGNIFICANT CHANGE UP (ref 0–0.5)
EOSINOPHIL NFR BLD AUTO: 0.6 % — SIGNIFICANT CHANGE UP (ref 0–6)
FIBRINOGEN PPP-MCNC: 579 MG/DL — HIGH (ref 200–445)
GLUCOSE SERPL-MCNC: 76 MG/DL — SIGNIFICANT CHANGE UP (ref 70–99)
HCT VFR BLD CALC: 36.6 % — SIGNIFICANT CHANGE UP (ref 34.5–45)
HGB BLD-MCNC: 12 G/DL — SIGNIFICANT CHANGE UP (ref 11.5–15.5)
IMM GRANULOCYTES NFR BLD AUTO: 1.3 % — HIGH (ref 0–0.9)
INR BLD: 0.88 — SIGNIFICANT CHANGE UP (ref 0.85–1.18)
LYMPHOCYTES # BLD AUTO: 1.94 K/UL — SIGNIFICANT CHANGE UP (ref 1–3.3)
LYMPHOCYTES # BLD AUTO: 13.5 % — SIGNIFICANT CHANGE UP (ref 13–44)
MCHC RBC-ENTMCNC: 29.2 PG — SIGNIFICANT CHANGE UP (ref 27–34)
MCHC RBC-ENTMCNC: 32.8 GM/DL — SIGNIFICANT CHANGE UP (ref 32–36)
MCV RBC AUTO: 89.1 FL — SIGNIFICANT CHANGE UP (ref 80–100)
MONOCYTES # BLD AUTO: 1.12 K/UL — HIGH (ref 0–0.9)
MONOCYTES NFR BLD AUTO: 7.8 % — SIGNIFICANT CHANGE UP (ref 2–14)
NEUTROPHILS # BLD AUTO: 10.93 K/UL — HIGH (ref 1.8–7.4)
NEUTROPHILS NFR BLD AUTO: 76.4 % — SIGNIFICANT CHANGE UP (ref 43–77)
NRBC # BLD: 0 /100 WBCS — SIGNIFICANT CHANGE UP (ref 0–0)
PLATELET # BLD AUTO: 272 K/UL — SIGNIFICANT CHANGE UP (ref 150–400)
POTASSIUM SERPL-MCNC: 4.4 MMOL/L — SIGNIFICANT CHANGE UP (ref 3.5–5.3)
POTASSIUM SERPL-SCNC: 4.4 MMOL/L — SIGNIFICANT CHANGE UP (ref 3.5–5.3)
PROT SERPL-MCNC: 6.7 G/DL — SIGNIFICANT CHANGE UP (ref 6–8.3)
PROTHROM AB SERPL-ACNC: 10.1 SEC — SIGNIFICANT CHANGE UP (ref 9.5–13)
RBC # BLD: 4.11 M/UL — SIGNIFICANT CHANGE UP (ref 3.8–5.2)
RBC # FLD: 13.5 % — SIGNIFICANT CHANGE UP (ref 10.3–14.5)
RH IG SCN BLD-IMP: POSITIVE — SIGNIFICANT CHANGE UP
RH IG SCN BLD-IMP: POSITIVE — SIGNIFICANT CHANGE UP
SODIUM SERPL-SCNC: 135 MMOL/L — SIGNIFICANT CHANGE UP (ref 135–145)
WBC # BLD: 14.32 K/UL — HIGH (ref 3.8–10.5)
WBC # FLD AUTO: 14.32 K/UL — HIGH (ref 3.8–10.5)

## 2024-08-24 PROCEDURE — 86900 BLOOD TYPING SEROLOGIC ABO: CPT

## 2024-08-24 PROCEDURE — 85610 PROTHROMBIN TIME: CPT

## 2024-08-24 PROCEDURE — 80053 COMPREHEN METABOLIC PANEL: CPT

## 2024-08-24 PROCEDURE — 86850 RBC ANTIBODY SCREEN: CPT

## 2024-08-24 PROCEDURE — 85025 COMPLETE CBC W/AUTO DIFF WBC: CPT

## 2024-08-24 PROCEDURE — 86901 BLOOD TYPING SEROLOGIC RH(D): CPT

## 2024-08-24 PROCEDURE — 85384 FIBRINOGEN ACTIVITY: CPT

## 2024-08-25 ENCOUNTER — TRANSCRIPTION ENCOUNTER (OUTPATIENT)
Age: 34
End: 2024-08-25

## 2024-08-26 ENCOUNTER — INPATIENT (INPATIENT)
Facility: HOSPITAL | Age: 34
LOS: 0 days | Discharge: HOME | DRG: 833 | End: 2024-08-27
Attending: OBSTETRICS & GYNECOLOGY | Admitting: OBSTETRICS & GYNECOLOGY
Payer: COMMERCIAL

## 2024-08-26 ENCOUNTER — APPOINTMENT (OUTPATIENT)
Dept: ANTEPARTUM | Facility: CLINIC | Age: 34
End: 2024-08-26

## 2024-08-26 ENCOUNTER — INPATIENT (INPATIENT)
Facility: HOSPITAL | Age: 34
LOS: 3 days | Discharge: HOME | End: 2024-08-30
Attending: OBSTETRICS & GYNECOLOGY | Admitting: OBSTETRICS & GYNECOLOGY
Payer: COMMERCIAL

## 2024-08-26 VITALS
SYSTOLIC BLOOD PRESSURE: 135 MMHG | HEIGHT: 62 IN | TEMPERATURE: 98 F | HEART RATE: 71 BPM | OXYGEN SATURATION: 99 % | WEIGHT: 176.37 LBS | RESPIRATION RATE: 18 BRPM | DIASTOLIC BLOOD PRESSURE: 80 MMHG

## 2024-08-26 VITALS
TEMPERATURE: 99 F | RESPIRATION RATE: 18 BRPM | HEART RATE: 88 BPM | SYSTOLIC BLOOD PRESSURE: 127 MMHG | DIASTOLIC BLOOD PRESSURE: 70 MMHG

## 2024-08-26 VITALS
DIASTOLIC BLOOD PRESSURE: 70 MMHG | HEART RATE: 88 BPM | RESPIRATION RATE: 18 BRPM | TEMPERATURE: 99 F | SYSTOLIC BLOOD PRESSURE: 127 MMHG

## 2024-08-26 DIAGNOSIS — K08.409 PARTIAL LOSS OF TEETH, UNSPECIFIED CAUSE, UNSPECIFIED CLASS: Chronic | ICD-10-CM

## 2024-08-26 DIAGNOSIS — O26.899 OTHER SPECIFIED PREGNANCY RELATED CONDITIONS, UNSPECIFIED TRIMESTER: ICD-10-CM

## 2024-08-26 LAB
ALBUMIN SERPL ELPH-MCNC: 3.5 G/DL — SIGNIFICANT CHANGE UP (ref 3.3–5)
ALP SERPL-CCNC: 123 U/L — HIGH (ref 40–120)
ALT FLD-CCNC: 5 U/L — LOW (ref 10–45)
ANION GAP SERPL CALC-SCNC: 13 MMOL/L — SIGNIFICANT CHANGE UP (ref 5–17)
AST SERPL-CCNC: 16 U/L — SIGNIFICANT CHANGE UP (ref 10–40)
BASOPHILS # BLD AUTO: 0.05 K/UL — SIGNIFICANT CHANGE UP (ref 0–0.2)
BASOPHILS NFR BLD AUTO: 0.2 % — SIGNIFICANT CHANGE UP (ref 0–2)
BILIRUB SERPL-MCNC: <0.2 MG/DL — SIGNIFICANT CHANGE UP (ref 0.2–1.2)
BLD GP AB SCN SERPL QL: NEGATIVE — SIGNIFICANT CHANGE UP
BUN SERPL-MCNC: 6 MG/DL — LOW (ref 7–23)
CALCIUM SERPL-MCNC: 9.1 MG/DL — SIGNIFICANT CHANGE UP (ref 8.4–10.5)
CHLORIDE SERPL-SCNC: 102 MMOL/L — SIGNIFICANT CHANGE UP (ref 96–108)
CO2 SERPL-SCNC: 20 MMOL/L — LOW (ref 22–31)
CREAT ?TM UR-MCNC: 88 MG/DL — SIGNIFICANT CHANGE UP
CREAT SERPL-MCNC: 0.5 MG/DL — SIGNIFICANT CHANGE UP (ref 0.5–1.3)
EGFR: 128 ML/MIN/1.73M2 — SIGNIFICANT CHANGE UP
EOSINOPHIL # BLD AUTO: 0.03 K/UL — SIGNIFICANT CHANGE UP (ref 0–0.5)
EOSINOPHIL NFR BLD AUTO: 0.1 % — SIGNIFICANT CHANGE UP (ref 0–6)
FIBRINOGEN PPP-MCNC: 487 MG/DL — HIGH (ref 200–445)
GLUCOSE SERPL-MCNC: 89 MG/DL — SIGNIFICANT CHANGE UP (ref 70–99)
HCT VFR BLD CALC: 36.2 % — SIGNIFICANT CHANGE UP (ref 34.5–45)
HGB BLD-MCNC: 12.3 G/DL — SIGNIFICANT CHANGE UP (ref 11.5–15.5)
IMM GRANULOCYTES NFR BLD AUTO: 0.9 % — SIGNIFICANT CHANGE UP (ref 0–0.9)
LDH SERPL L TO P-CCNC: 140 U/L — SIGNIFICANT CHANGE UP (ref 50–242)
LYMPHOCYTES # BLD AUTO: 1.58 K/UL — SIGNIFICANT CHANGE UP (ref 1–3.3)
LYMPHOCYTES # BLD AUTO: 7.9 % — LOW (ref 13–44)
MCHC RBC-ENTMCNC: 29.2 PG — SIGNIFICANT CHANGE UP (ref 27–34)
MCHC RBC-ENTMCNC: 34 GM/DL — SIGNIFICANT CHANGE UP (ref 32–36)
MCV RBC AUTO: 86 FL — SIGNIFICANT CHANGE UP (ref 80–100)
MONOCYTES # BLD AUTO: 1.16 K/UL — HIGH (ref 0–0.9)
MONOCYTES NFR BLD AUTO: 5.8 % — SIGNIFICANT CHANGE UP (ref 2–14)
NEUTROPHILS # BLD AUTO: 17.02 K/UL — HIGH (ref 1.8–7.4)
NEUTROPHILS NFR BLD AUTO: 85.1 % — HIGH (ref 43–77)
NRBC # BLD: 0 /100 WBCS — SIGNIFICANT CHANGE UP (ref 0–0)
PLATELET # BLD AUTO: 268 K/UL — SIGNIFICANT CHANGE UP (ref 150–400)
POTASSIUM SERPL-MCNC: 4.1 MMOL/L — SIGNIFICANT CHANGE UP (ref 3.5–5.3)
POTASSIUM SERPL-SCNC: 4.1 MMOL/L — SIGNIFICANT CHANGE UP (ref 3.5–5.3)
PROT ?TM UR-MCNC: 256 MG/DL — SIGNIFICANT CHANGE UP (ref 0–12)
PROT SERPL-MCNC: 6.4 G/DL — SIGNIFICANT CHANGE UP (ref 6–8.3)
PROT/CREAT UR-RTO: 2.9 RATIO — SIGNIFICANT CHANGE UP (ref 0–0.2)
RBC # BLD: 4.21 M/UL — SIGNIFICANT CHANGE UP (ref 3.8–5.2)
RBC # FLD: 13.4 % — SIGNIFICANT CHANGE UP (ref 10.3–14.5)
RH IG SCN BLD-IMP: POSITIVE — SIGNIFICANT CHANGE UP
SODIUM SERPL-SCNC: 135 MMOL/L — SIGNIFICANT CHANGE UP (ref 135–145)
URATE SERPL-MCNC: 6 MG/DL — SIGNIFICANT CHANGE UP (ref 2.5–7)
WBC # BLD: 20.02 K/UL — HIGH (ref 3.8–10.5)
WBC # FLD AUTO: 20.02 K/UL — HIGH (ref 3.8–10.5)

## 2024-08-26 PROCEDURE — 59510 CESAREAN DELIVERY: CPT

## 2024-08-26 RX ORDER — DIPHENHYDRAMINE HCL 50 MG
25 CAPSULE ORAL EVERY 6 HOURS
Refills: 0 | Status: DISCONTINUED | OUTPATIENT
Start: 2024-08-26 | End: 2024-08-30

## 2024-08-26 RX ORDER — AZITHROMYCIN 500 MG/1
500 TABLET, FILM COATED ORAL ONCE
Refills: 0 | Status: COMPLETED | OUTPATIENT
Start: 2024-08-26 | End: 2024-08-26

## 2024-08-26 RX ORDER — ONDANSETRON 2 MG/ML
4 INJECTION, SOLUTION INTRAMUSCULAR; INTRAVENOUS EVERY 6 HOURS
Refills: 0 | Status: DISCONTINUED | OUTPATIENT
Start: 2024-08-26 | End: 2024-08-30

## 2024-08-26 RX ORDER — OXYCODONE HYDROCHLORIDE 5 MG/1
5 TABLET ORAL
Refills: 0 | Status: DISCONTINUED | OUTPATIENT
Start: 2024-08-26 | End: 2024-08-30

## 2024-08-26 RX ORDER — SODIUM CITRATE AND CITRIC ACID MONOHYDRATE 334; 500 MG/5ML; MG/5ML
30 SOLUTION ORAL ONCE
Refills: 0 | Status: DISCONTINUED | OUTPATIENT
Start: 2024-08-26 | End: 2024-08-27

## 2024-08-26 RX ORDER — CHLORHEXIDINE GLUCONATE 40 MG/ML
1 SOLUTION TOPICAL DAILY
Refills: 0 | Status: DISCONTINUED | OUTPATIENT
Start: 2024-08-26 | End: 2024-08-27

## 2024-08-26 RX ORDER — CEFAZOLIN SODIUM 2 G/100ML
2000 INJECTION, SOLUTION INTRAVENOUS ONCE
Refills: 0 | Status: COMPLETED | OUTPATIENT
Start: 2024-08-26 | End: 2024-08-26

## 2024-08-26 RX ORDER — OXYTOCIN 10 UNIT/ML
333.33 AMPUL (ML) INJECTION
Qty: 20 | Refills: 0 | Status: DISCONTINUED | OUTPATIENT
Start: 2024-08-26 | End: 2024-08-27

## 2024-08-26 RX ORDER — KETOROLAC TROMETHAMINE 30 MG/ML
30 INJECTION, SOLUTION INTRAMUSCULAR EVERY 6 HOURS
Refills: 0 | Status: DISCONTINUED | OUTPATIENT
Start: 2024-08-26 | End: 2024-08-27

## 2024-08-26 RX ORDER — OXYTOCIN 10 UNIT/ML
333.33 AMPUL (ML) INJECTION
Qty: 20 | Refills: 0 | Status: DISCONTINUED | OUTPATIENT
Start: 2024-08-26 | End: 2024-08-26

## 2024-08-26 RX ORDER — TETANUS TOXOID, REDUCED DIPHTHERIA TOXOID AND ACELLULAR PERTUSSIS VACCINE, ADSORBED 5; 2.5; 8; 8; 2.5 [IU]/.5ML; [IU]/.5ML; UG/.5ML; UG/.5ML; UG/.5ML
0.5 SUSPENSION INTRAMUSCULAR ONCE
Refills: 0 | Status: DISCONTINUED | OUTPATIENT
Start: 2024-08-26 | End: 2024-08-30

## 2024-08-26 RX ORDER — CHLORHEXIDINE GLUCONATE 40 MG/ML
1 SOLUTION TOPICAL DAILY
Refills: 0 | Status: DISCONTINUED | OUTPATIENT
Start: 2024-08-26 | End: 2024-08-26

## 2024-08-26 RX ORDER — IBUPROFEN 600 MG
600 TABLET ORAL EVERY 6 HOURS
Refills: 0 | Status: COMPLETED | OUTPATIENT
Start: 2024-08-26 | End: 2025-07-25

## 2024-08-26 RX ORDER — SODIUM CITRATE AND CITRIC ACID MONOHYDRATE 334; 500 MG/5ML; MG/5ML
30 SOLUTION ORAL ONCE
Refills: 0 | Status: COMPLETED | OUTPATIENT
Start: 2024-08-26 | End: 2024-08-26

## 2024-08-26 RX ORDER — OXYCODONE HYDROCHLORIDE 5 MG/1
5 TABLET ORAL ONCE
Refills: 0 | Status: DISCONTINUED | OUTPATIENT
Start: 2024-08-26 | End: 2024-08-30

## 2024-08-26 RX ORDER — ACETAMINOPHEN 325 MG/1
1000 TABLET ORAL ONCE
Refills: 0 | Status: COMPLETED | OUTPATIENT
Start: 2024-08-26 | End: 2024-08-26

## 2024-08-26 RX ORDER — FAMOTIDINE 10 MG/ML
20 INJECTION INTRAVENOUS ONCE
Refills: 0 | Status: COMPLETED | OUTPATIENT
Start: 2024-08-26 | End: 2024-08-26

## 2024-08-26 RX ORDER — DEXAMETHASONE 0.75 MG
4 TABLET ORAL EVERY 6 HOURS
Refills: 0 | Status: DISCONTINUED | OUTPATIENT
Start: 2024-08-26 | End: 2024-08-30

## 2024-08-26 RX ORDER — NALOXONE HCL 1 MG/ML
0.1 VIAL (ML) INJECTION
Refills: 0 | Status: DISCONTINUED | OUTPATIENT
Start: 2024-08-26 | End: 2024-08-30

## 2024-08-26 RX ORDER — LANOLIN
1 OINTMENT (GRAM) TOPICAL EVERY 6 HOURS
Refills: 0 | Status: DISCONTINUED | OUTPATIENT
Start: 2024-08-26 | End: 2024-08-30

## 2024-08-26 RX ORDER — ACETAMINOPHEN 325 MG/1
975 TABLET ORAL
Refills: 0 | Status: DISCONTINUED | OUTPATIENT
Start: 2024-08-26 | End: 2024-08-30

## 2024-08-26 RX ORDER — OXYTOCIN 10 UNIT/ML
333.33 AMPUL (ML) INJECTION
Qty: 20 | Refills: 0 | Status: DISCONTINUED | OUTPATIENT
Start: 2024-08-26 | End: 2024-08-30

## 2024-08-26 RX ADMIN — CHLORHEXIDINE GLUCONATE 1 APPLICATION(S): 40 SOLUTION TOPICAL at 17:00

## 2024-08-26 RX ADMIN — SODIUM CITRATE AND CITRIC ACID MONOHYDRATE 30 MILLILITER(S): 334; 500 SOLUTION ORAL at 20:15

## 2024-08-26 RX ADMIN — ACETAMINOPHEN 400 MILLIGRAM(S): 325 TABLET ORAL at 18:30

## 2024-08-26 RX ADMIN — AZITHROMYCIN 255 MILLIGRAM(S): 500 TABLET, FILM COATED ORAL at 20:30

## 2024-08-26 RX ADMIN — ACETAMINOPHEN 1000 MILLIGRAM(S): 325 TABLET ORAL at 19:00

## 2024-08-26 RX ADMIN — CEFAZOLIN SODIUM 100 MILLIGRAM(S): 2 INJECTION, SOLUTION INTRAVENOUS at 20:20

## 2024-08-26 RX ADMIN — Medication 125 MILLILITER(S): at 18:01

## 2024-08-26 RX ADMIN — Medication 200 MILLILITER(S): at 17:21

## 2024-08-26 RX ADMIN — KETOROLAC TROMETHAMINE 30 MILLIGRAM(S): 30 INJECTION, SOLUTION INTRAMUSCULAR at 23:21

## 2024-08-26 RX ADMIN — FAMOTIDINE 20 MILLIGRAM(S): 10 INJECTION INTRAVENOUS at 18:30

## 2024-08-26 RX ADMIN — FAMOTIDINE 20 MILLIGRAM(S): 10 INJECTION INTRAVENOUS at 20:05

## 2024-08-26 RX ADMIN — Medication 125 MILLILITER(S): at 19:16

## 2024-08-26 NOTE — OB PROVIDER H&P - HISTORY OF PRESENT ILLNESS
Pt is a 34y  @ 39w2d presenting for pC/S due to ruptured membranes, painful contractions, and breech presentation.   Patient denies vaginal bleeding. Endorses good fetal movement.     ANTE: Spontaneous. NIPT WNL. Anatomy WNL. Passed GCT.   No HTN or Thyroid issues in pregnancy. GBS negative. EFW: 3000grams.     Obhx:   G1 - molar pregnancy - D&C   G2 - current  Gynhx: Denies fibroids, cysts, abnormal pap smears, or STDs  Mhx: Alport syndrome, hashimoto's (controlled without meds currently)   Shx: D&C   Meds: PNV, ASA  All: NKDA     Physical Exam:   T(C): 37 (24 @ 16:02), Max: 37 (24 @ 16:02)  HR: 88 (24 @ 16:02) (88 - 88)  BP: 127/70 (24 @ 16:02) (127/70 - 127/70)  RR: 18 (24 @ 16:02) (18 - 18)  SpO2: --    General: A&Ox3 - NAD   Pulmonary: No increased work of breathing  Abdomen: Soft; Nontender; Gravid  Extremities: No pedal edema or tenderness bilaterally   Skin: No rashes, masses, or lesions     SVE: 1/L  FHT: 120bpm baseline, moderate variability, + accels, no decels  TOCO: Contractions q 3-4min apart   TAUS: Cephalic, anterior placenta, ELAINE 1    A/P: Pt is a 34y  @ 39w2d presenting for pC/S for breech and rupture  - Admit to L&D  - NPO/IVF  - Consent signed   - Prenatals reviewed; GBS negative  - Full labs sent   - Continuos fetal and TOCO monitoring until OR   - 2g Ancef for infection PPX, Azithromycin for ruptured membranes   - Anesthesia consult   - Plan for  section     Jay Mancini PA-C   Discussed w/ Dr. Jack

## 2024-08-26 NOTE — OB RN PATIENT PROFILE - FALL HARM RISK - UNIVERSAL INTERVENTIONS
Bed in lowest position, wheels locked, appropriate side rails in place/Call bell, personal items and telephone in reach/Instruct patient to call for assistance before getting out of bed or chair/Non-slip footwear when patient is out of bed/[place] to call system/Physically safe environment - no spills, clutter or unnecessary equipment/Purposeful Proactive Rounding/Room/bathroom lighting operational, light cord in reach

## 2024-08-26 NOTE — OB RN INTRAOPERATIVE NOTE - NSSELHIDDEN_OBGYN_ALL_OB_FT
[[email or web address]_OB_FT:[id number]],[NS_DeliveryAssist1_OBGYN_ALL_OB_FT:[id number]],[NS_DeliveryRN_OBGYN_ALL_OB_FT:[id number]=]

## 2024-08-26 NOTE — OB RN TRIAGE NOTE - FALL HARM RISK - UNIVERSAL INTERVENTIONS
Bed in lowest position, wheels locked, appropriate side rails in place/Call bell, personal items and telephone in reach/Instruct patient to call for assistance before getting out of bed or chair/Non-slip footwear when patient is out of bed/[place] to call system/Physically safe environment - no spills, clutter or unnecessary equipment/Purposeful Proactive Rounding/Room/bathroom lighting operational, light cord in reach

## 2024-08-26 NOTE — OB PROVIDER DELIVERY SUMMARY - NSSELHIDDEN_OBGYN_ALL_OB_FT
[NS_DeliveryAttending1_OBGYN_ALL_OB_FT:[id number]=],[NS_DeliveryAssist1_OBGYN_ALL_OB_FT:[id number]=]

## 2024-08-26 NOTE — OB RN INTRAOPERATIVE NOTE - NS_ADD OB DELIVERY PROCEDURE_OBGYN_ALL_OB
Problem: Safety Risk - Non-Violent Restraints  Goal: Patient will remain free from self-harm  Description  INTERVENTIONS:  - Apply the least restrictive restraint to prevent harm  - Notify patient and family of reasons restraints applied  - Assess for an Click here

## 2024-08-26 NOTE — OB PROVIDER H&P - NSLOWPPHRISK_OBGYN_A_OB
No previous uterine incision/[place] Pregnancy/Less than or equal to 4 previous vaginal births/No known bleeding disorder/No history of postpartum hemorrhage/No other PPH risks indicated
98.3

## 2024-08-26 NOTE — PRE-ANESTHESIA EVALUATION ADULT - NSANTHPMHFT_GEN_ALL_CORE
Pt is a 34y  @ 39w2d presenting for pC/S due to ruptured membranes, painful contractions, and breech presentation.   Patient denies vaginal bleeding. Endorses good fetal movement.     ANTE: Spontaneous. NIPT WNL. Anatomy WNL. Passed GCT.   No HTN or Thyroid issues in pregnancy. GBS negative. EFW: 3000grams.     Obhx:   G1 - molar pregnancy - D&C   G2 - current  Gynhx: Denies fibroids, cysts, abnormal pap smears, or STDs  Mhx: Alport syndrome, hashimoto's (controlled without meds currently)   Shx: D&C   Meds: PNV, ASA  All: NKDA

## 2024-08-26 NOTE — OB RN PATIENT PROFILE - INFANT HOME WITH MOTHER, OB PROFILE
I know about it, I already spoke to Maria A and I spoke with Dr Mccord in ER at [facility]  Maria A is on her way there   thx   yes

## 2024-08-26 NOTE — PRE-ANESTHESIA EVALUATION ADULT - NSPROPOSEDPROCEDFT_GEN_ALL_CORE
Subjective   Patient ID: Polly is a 54 year old female.    Chief Complaint   Patient presents with   • New Patient   • Physical     New patient, here for annual physical. Patient has past medical history of anxiety and shingles rash.  Reports history of intermittent vertigo for past 10 years. Can have very bad episodes. Last episode was 1 month ago which was broguht on by a position while exercising.  Patient in perimenopause. Reports having vasomotor symptoms. Wakes up after 3 hrs with sweating at night. Started at age 50.    Preventative:  Mammogram- a few years ago, no previous abnormal mammograms  Pap smear- normal 2020  Colonoscopy- never done  Exercises regularly, teaches classes      Patient's medications, allergies, past medical, surgical, social and family histories were reviewed and updated as appropriate.    Review of Systems   Constitutional: Negative.    HENT: Negative.    Eyes: Negative.    Respiratory: Negative.    Cardiovascular: Negative.    Gastrointestinal: Negative.    Endocrine:        +vasomotor symptoms   Genitourinary: Negative.    Musculoskeletal: Negative.    Skin: Negative.    Allergic/Immunologic: Negative.    Neurological: Negative.    Hematological: Negative.    Psychiatric/Behavioral: Negative.        Objective   Physical Exam  Vitals and nursing note reviewed.   Constitutional:       Appearance: Normal appearance. She is well-developed.   HENT:      Head: Normocephalic.      Right Ear: Tympanic membrane, ear canal and external ear normal.      Left Ear: Tympanic membrane, ear canal and external ear normal.      Nose: Nose normal.      Mouth/Throat:      Mouth: Mucous membranes are moist.      Pharynx: Oropharynx is clear.      Neck: Neck supple.   Eyes:      Conjunctiva/sclera: Conjunctivae normal.      Pupils: Pupils are equal, round, and reactive to light.   Cardiovascular:      Rate and Rhythm: Normal rate and regular rhythm.      Heart sounds: Normal heart sounds.   Pulmonary:       Effort: Pulmonary effort is normal.      Breath sounds: Normal breath sounds.   Abdominal:      General: Bowel sounds are normal.      Palpations: Abdomen is soft.   Skin:     General: Skin is warm and dry.   Neurological:      Mental Status: She is alert and oriented to person, place, and time.   Psychiatric:         Mood and Affect: Mood normal.         Behavior: Behavior normal.         Thought Content: Thought content normal.         Judgment: Judgment normal.         Assessment   Problem List Items Addressed This Visit        ENT    Vertigo    Relevant Orders    SERVICE TO NEUROLOGY      Other Visit Diagnoses     Annual physical exam    -  Primary    Relevant Orders    CBC WITH DIFFERENTIAL    COMPREHENSIVE METABOLIC PANEL    LIPID PANEL WITH REFLEX    THYROID STIMULATING HORMONE    Hypovitaminosis D        Relevant Orders    VITAMIN D -25 HYDROXY    Encounter for screening mammogram for malignant neoplasm of breast        Relevant Orders    MAMMO DIAGNOSTIC IMPLANT BILATERAL W DAMION       1. Fasting blood work ordered  -check vitamin D level  2. Referral to neurology for further evaluation of vertigo  3. Annual mammogram ordered  Counseled on heart healthy diet, healthy protein substitutes through fish/poultry/soy/beans and legumes in place of red meat, means to incorporate more fruits and vegetables in the diet, use of nuts and olive oil, and using whole grains in place of processed carbohydrates. Recommended Mediterranean style diet. Encouraged more consumption of water and avoidance of sodas and sweetened beverages.    Declined colonoscopy today    Pap smears and HPV testing as according to ACOG guidelines.  Mammograms yearly  Recommend flu and shingles vaccine-declined today.     Health Maintenance Summary     DTaP/Tdap/Td Vaccine (1 - Tdap)  Overdue - never done    Colorectal Cancer Screen- (Colonoscopy - Every 10 Years)  Overdue - never done    Breast Cancer Screening (Yearly)  Ordered on 10/24/2022     COVID-19 Vaccine (3 - Booster for Pfizer series)  Overdue since 10/13/2021    Shingles Vaccine (1 of 2)  Postponed until 10/26/2022    Influenza Vaccine (1)  Postponed until 6/30/2023    Hepatitis B Vaccine (1 of 3 - 3-dose series)  Postponed until 10/25/2023    Depression Screening (Yearly)  Next due on 10/24/2023    Cervical Cancer Screen 30-64 - (Pap Only - Every 3 Years)  Next due on 5/3/2025    Meningococcal Vaccine   Aged Out    HPV Vaccine   Aged Out    Pneumococcal Vaccine 0-64   Aged Out          Schedule follow up: in a year, at next annual exam    section

## 2024-08-26 NOTE — OB RN INTRAOPERATIVE NOTE - NS_DRESSSECURWITH_OBGYN_ALL_OB
----- Message from Baron Manzanares sent at 11/9/2018  8:46 AM CST -----  Contact: Brenda Abadie at [phone]  Telephone consult    The patient's daughter called and cancelled the appointment for today because he was seen on October 12, 2018 for an ER follow up. She wants to know if you would like the patient to schedule a 3 month follow up from that day.    Thank you   Paper Tape

## 2024-08-26 NOTE — OB RN INTRAOPERATIVE NOTE - NSSELHIDDEN_OBGYN_ALL_OB_FT
[NS_DeliveryAttending1_OBGYN_ALL_OB_FT:[id number]],[NS_DeliveryAssist1_OBGYN_ALL_OB_FT:[id number]=],[NS_DeliveryRN_OBGYN_ALL_OB_FT:[id number]=]

## 2024-08-26 NOTE — OB RN DELIVERY SUMMARY - NSSELHIDDEN_OBGYN_ALL_OB_FT
[NS_DeliveryAttending1_OBGYN_ALL_OB_FT:[id number]=],[NS_DeliveryAssist1_OBGYN_ALL_OB_FT:[id number]=],[NS_DeliveryRN_OBGYN_ALL_OB_FT:[id number]=]

## 2024-08-26 NOTE — OB RN DELIVERY SUMMARY - NSSELHIDDEN_OBGYN_ALL_OB_FT
[NS_DeliveryAttending1_OBGYN_ALL_OB_FT:[id number]=],[NS_DeliveryAssist1_OBGYN_ALL_OB_FT:[id number]=],[NS_DeliveryRN_OBGYN_ALL_OB_FT:[id number]=]

## 2024-08-26 NOTE — OB PROVIDER H&P - NSOBVTERISKREFER_OBGYN_ALL_OB
generally healthy pt here for requesting covid swab.  asymptomatic without any acute complaints. Denies f/c, headache, cough, sore throat, uri sxs, sob, abd pain, nvd, travel  + exposure, fully vaccinated
Refer to the Assessment tab to view/cancel completed assessment.

## 2024-08-26 NOTE — OB RN DELIVERY SUMMARY - NSCSDELIVAASS_OBGYN_ALL_OB
N/A
no chest pain/no peripheral edema/no paroxysmal nocturnal dyspnea/no dyspnea on exertion/no palpitations/no orthopnea

## 2024-08-26 NOTE — OB PROVIDER DELIVERY SUMMARY - NSCSPRIMARYINDICATIONA_OBGYN_ALL_OB
Palliative Care Focus Note     Chart reviewed.  Alerted by Unit SW Marian that family is interested in patient returning to [facility] with hospice services.  Per Marian, hospice choice has been offered and [facility] affiliation explained.      Family is electing to utilize [facility] for hospice services upon return to [facility].     Writer placed call to [facility] liaison, Lillian Jones (ph:[phone]) and left voicemail making aware of hospice referral.    Writer faxed referral to [facility] [place] (fax:[phone]) per hospital policy.     Will continue to coordinate with Unit  and [facility] for hospice arrangements upon to [facility].     ADDENDUM (1134)  Spoke with [facility] liaison- Ailin (ph:[phone]).  Ailin states she just left hospital after meeting with daughter Lakeisha and patient at patient bedside.  Plan to proceed with [facility] upon return to [facility].  Per Lakeisha, patient would rather stay in her bed vs a hospital bed at this time. Due to this, Ailin will begin process of DME delivery (oxygen, over the bed table, commode and transfer wheelchair).  Once equipment is delivered, patient can return to [facility] (anticipate next 1-2 days).       Aware MD is considering POA Activation.  If POA is activated, writer to update Ailin and fax activation form to [facility] (POA document already faxed with hospice referral).     ADDENDUM (3529)  Call from APOA/daughter Lakeisha.  Lakeisha is hoping that hospital will consider allowing patient to stay in hospital for \"a few more days\" to see if patient's mentation improves at all - stating, patient was alert and oriented prior to being found down, and family is hopeful she will continue to \"clear up\" over the next few days.  Alerted MD to this request.        Will continue to follow.       Solange Eagle, MSW, CAPSW, APHSW-C  Palliative Care & Hospice Coordinator  [facility]  [place]  Phone: [phone]     Breech Presentation

## 2024-08-26 NOTE — OB PROVIDER DELIVERY SUMMARY - NSPROVIDERDELIVERYNOTE_OBGYN_ALL_OB_FT
Uncomplicated primary CS for breech presentation. Vigorous female infant delivered without difficulty. Apgars 9/9. Birth weight 3260g. Placenta delivered intact. Uterus closed with one layer, good hemostasis.  cc.

## 2024-08-26 NOTE — OB RN PREOPERATIVE CHECKLIST - IDENTIFICATION BAND VERIFIED
[FreeTextEntry1] : I, DEBBIE DUNAWAY, acted solely as a scribe for Dr. Saroj Hernandez on this date 08/14/2024.  All medical record entries made by the Scribe were at my, Dr. Saroj Hernandez, direction and personally dictated by me on 08/14/2024. I have reviewed the chart and agree that the record accurately reflects my personal performance of the history, physical exam, assessment and plan. I have also personally directed, reviewed, and agreed with the chart.								  done

## 2024-08-26 NOTE — OB PROVIDER DELIVERY SUMMARY - NSLOWPPHRISK_OBGYN_A_OB
No previous uterine incision/[place] Pregnancy/Less than or equal to 4 previous vaginal births/No known bleeding disorder/No history of postpartum hemorrhage/No other PPH risks indicated

## 2024-08-27 ENCOUNTER — TRANSCRIPTION ENCOUNTER (OUTPATIENT)
Age: 34
End: 2024-08-27

## 2024-08-27 ENCOUNTER — NON-APPOINTMENT (OUTPATIENT)
Age: 34
End: 2024-08-27

## 2024-08-27 LAB
ANISOCYTOSIS BLD QL: SLIGHT — SIGNIFICANT CHANGE UP
BASOPHILS # BLD AUTO: 0 K/UL — SIGNIFICANT CHANGE UP (ref 0–0.2)
BASOPHILS NFR BLD AUTO: 0 % — SIGNIFICANT CHANGE UP (ref 0–2)
BURR CELLS BLD QL SMEAR: PRESENT — SIGNIFICANT CHANGE UP
EOSINOPHIL # BLD AUTO: 0 K/UL — SIGNIFICANT CHANGE UP (ref 0–0.5)
EOSINOPHIL NFR BLD AUTO: 0 % — SIGNIFICANT CHANGE UP (ref 0–6)
HCT VFR BLD CALC: 25 % — LOW (ref 34.5–45)
HGB BLD-MCNC: 8.4 G/DL — LOW (ref 11.5–15.5)
LYMPHOCYTES # BLD AUTO: 0.61 K/UL — LOW (ref 1–3.3)
LYMPHOCYTES # BLD AUTO: 2.6 % — LOW (ref 13–44)
MACROCYTES BLD QL: SLIGHT — SIGNIFICANT CHANGE UP
MANUAL SMEAR VERIFICATION: SIGNIFICANT CHANGE UP
MCHC RBC-ENTMCNC: 30.3 PG — SIGNIFICANT CHANGE UP (ref 27–34)
MCHC RBC-ENTMCNC: 33.6 GM/DL — SIGNIFICANT CHANGE UP (ref 32–36)
MCV RBC AUTO: 90.3 FL — SIGNIFICANT CHANGE UP (ref 80–100)
MONOCYTES # BLD AUTO: 0.4 K/UL — SIGNIFICANT CHANGE UP (ref 0–0.9)
MONOCYTES NFR BLD AUTO: 1.7 % — LOW (ref 2–14)
NEUTROPHILS # BLD AUTO: 22.59 K/UL — HIGH (ref 1.8–7.4)
NEUTROPHILS NFR BLD AUTO: 95.7 % — HIGH (ref 43–77)
OVALOCYTES BLD QL SMEAR: SLIGHT — SIGNIFICANT CHANGE UP
PLAT MORPH BLD: ABNORMAL
PLATELET # BLD AUTO: 192 K/UL — SIGNIFICANT CHANGE UP (ref 150–400)
POIKILOCYTOSIS BLD QL AUTO: SLIGHT — SIGNIFICANT CHANGE UP
RBC # BLD: 2.77 M/UL — LOW (ref 3.8–5.2)
RBC # FLD: 13.3 % — SIGNIFICANT CHANGE UP (ref 10.3–14.5)
RBC BLD AUTO: ABNORMAL
RUBV IGG SER-ACNC: 13.6 INDEX — SIGNIFICANT CHANGE UP
RUBV IGG SER-IMP: POSITIVE — SIGNIFICANT CHANGE UP
T PALLIDUM AB TITR SER: NEGATIVE — SIGNIFICANT CHANGE UP
WBC # BLD: 23.6 K/UL — HIGH (ref 3.8–10.5)
WBC # FLD AUTO: 23.6 K/UL — HIGH (ref 3.8–10.5)

## 2024-08-27 RX ORDER — SODIUM FERRIC GLUCONATE COMPLEX 12.5 MG/ML
100 INJECTION INTRAVENOUS ONCE
Refills: 0 | Status: COMPLETED | OUTPATIENT
Start: 2024-08-27 | End: 2024-08-28

## 2024-08-27 RX ORDER — IBUPROFEN 600 MG
600 TABLET ORAL EVERY 6 HOURS
Refills: 0 | Status: DISCONTINUED | OUTPATIENT
Start: 2024-08-27 | End: 2024-08-30

## 2024-08-27 RX ORDER — ENOXAPARIN SODIUM 100 MG/ML
40 INJECTION SUBCUTANEOUS EVERY 24 HOURS
Refills: 0 | Status: DISCONTINUED | OUTPATIENT
Start: 2024-08-27 | End: 2024-08-30

## 2024-08-27 RX ORDER — VITAMIN A, ASCORBIC ACID, VITAMIN D, .ALPHA.-TOCOPHEROL, THIAMINE MONONITRATE, RIBOFLAVIN, NIACIN, PYRIDOXINE HYDROCHLORIDE, FOLIC ACID, CYANOCOBALAMIN, CALCIUM, IRON, MAGNESIUM, ZINC, AND COPPER 2700; 70; 400; 30; 1.6; 1.8; 18; 2.5; 1; 12; 100; 65; 25; 25; 2 [IU]/1; MG/1; [IU]/1; [IU]/1; MG/1; MG/1; MG/1; MG/1; MG/1; UG/1; MG/1; MG/1; MG/1; MG/1; MG/1
1 TABLET ORAL
Refills: 0 | DISCHARGE

## 2024-08-27 RX ORDER — ENOXAPARIN SODIUM 100 MG/ML
40 INJECTION SUBCUTANEOUS EVERY 24 HOURS
Refills: 0 | Status: DISCONTINUED | OUTPATIENT
Start: 2024-08-27 | End: 2024-08-27

## 2024-08-27 RX ORDER — IRON SUCROSE 20 MG/ML
300 INJECTION, SOLUTION INTRAVENOUS EVERY 24 HOURS
Refills: 0 | Status: DISCONTINUED | OUTPATIENT
Start: 2024-08-27 | End: 2024-08-27

## 2024-08-27 RX ORDER — SODIUM FERRIC GLUCONATE COMPLEX 12.5 MG/ML
25 INJECTION INTRAVENOUS ONCE
Refills: 0 | Status: COMPLETED | OUTPATIENT
Start: 2024-08-27 | End: 2024-08-27

## 2024-08-27 RX ORDER — IBUPROFEN 600 MG
1 TABLET ORAL
Qty: 0 | Refills: 0 | DISCHARGE
Start: 2024-08-27

## 2024-08-27 RX ORDER — ACETAMINOPHEN 325 MG/1
3 TABLET ORAL
Qty: 0 | Refills: 0 | DISCHARGE
Start: 2024-08-27

## 2024-08-27 RX ADMIN — Medication 600 MILLIGRAM(S): at 20:48

## 2024-08-27 RX ADMIN — ACETAMINOPHEN 975 MILLIGRAM(S): 325 TABLET ORAL at 01:47

## 2024-08-27 RX ADMIN — KETOROLAC TROMETHAMINE 30 MILLIGRAM(S): 30 INJECTION, SOLUTION INTRAMUSCULAR at 08:41

## 2024-08-27 RX ADMIN — ACETAMINOPHEN 975 MILLIGRAM(S): 325 TABLET ORAL at 17:59

## 2024-08-27 RX ADMIN — ACETAMINOPHEN 975 MILLIGRAM(S): 325 TABLET ORAL at 12:24

## 2024-08-27 RX ADMIN — ACETAMINOPHEN 975 MILLIGRAM(S): 325 TABLET ORAL at 06:29

## 2024-08-27 RX ADMIN — KETOROLAC TROMETHAMINE 30 MILLIGRAM(S): 30 INJECTION, SOLUTION INTRAMUSCULAR at 15:36

## 2024-08-27 RX ADMIN — Medication 80 MILLIGRAM(S): at 15:40

## 2024-08-27 RX ADMIN — SODIUM FERRIC GLUCONATE COMPLEX 102 MILLIGRAM(S): 12.5 INJECTION INTRAVENOUS at 15:37

## 2024-08-27 RX ADMIN — ENOXAPARIN SODIUM 40 MILLIGRAM(S): 100 INJECTION SUBCUTANEOUS at 15:37

## 2024-08-27 RX ADMIN — Medication 80 MILLIGRAM(S): at 12:24

## 2024-08-27 RX ADMIN — KETOROLAC TROMETHAMINE 30 MILLIGRAM(S): 30 INJECTION, SOLUTION INTRAMUSCULAR at 04:05

## 2024-08-27 NOTE — DISCHARGE NOTE OB - HOSPITAL COURSE
Admitted for  section due to ROM and breech presentation.  Uncomplicated surgery and postoperative course.  Acute blood loss anemia noted on post-operative CBC.  Patient stable with normal vital signs.  No intervention necessary.

## 2024-08-27 NOTE — LACTATION INITIAL EVALUATION - NS LACT CON REASON FOR REQ
LC to bedside of Myrna Hartman, a 33 yo  parent of a 39w2d infant. Myrna states that she plans to breastfeed. 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  On exam, breasts are round, soft, symmetric. Nipples intact and everted, +colostrum.  LC assisted with hand placement in the cross-cradle and football positions. In both positions, baby latched deeply with wide gape, flanged lips, nutritive suckling and swallowing. Myrna endorses a comfortable latch.  Plan: Breastfeed on demand at least Q2-3h, hand expression of colostrum as needed, monitor diaper output, follow up with pediatrician.  Reviewed basics of breastfeeding education including   *Normal  feeding/behavior patterns including cluster feeding   *Benefits of on-demand feeding and skin-to-skin   *Infant feeding cues and strategies to feed a sleepy baby   *Stimulate breasts & feed baby at least 8-10x per 24 hrs, keys to an adequate supply   *Breastfeeding positions, proper/improper latch & strategies to achieve a deep, effective latch   *Signs of milk transfer at the breast   *Benefits/methods for hand expression   *Signs of satiety & adequate  intake/output such as wet/dirty diapers and weight.   Patient verbalized understanding of education and how/when to follow up. All questions answered at this time, and LC available via RN.

## 2024-08-27 NOTE — CHART NOTE - NSCHARTNOTEFT_GEN_A_CORE
Patient seen at bedside after receiving call from nurse about blood on steri-strips.     GA: NAD, A+0 x 3  Pulm: comfortable on RA  Abd: + BS, soft, nontender, nondistended, no rebound or guarding, incision with scant dried blood below steri-strips. Incision otherwise appearing clean, dry and intact. No erythema, swelling or discharge noted around the incision site.   : nix in situ    A/P 34y s/p primary  section for breech presentation after presenting in early labor, POD #1 with traces of dried blood on steri strips wnl and no active bleeding from the incision site. Continue to monitor closely and change steristrips if needed.     Discussed with Dr. Bernabe Parker PGY4  Ayah Lake PGY1

## 2024-08-27 NOTE — DISCHARGE NOTE OB - PATIENT PORTAL LINK FT
You can access the FollowMyHealth Patient Portal offered by [facility] by registering at the following website: [email or web address]. By joining [facility]’s FollowMyHealth portal, you will also be able to view your health information using other applications (apps) compatible with our system.

## 2024-08-27 NOTE — DISCHARGE NOTE OB - CARE PROVIDER_API CALL
Fani Junior  Obstetrics and Gynecology  [address] [place] [id number]  Phone: [phone]  Fax: [phone]  Follow Up Time: 2 weeks

## 2024-08-27 NOTE — DISCHARGE NOTE OB - DISCHARGE DATE
Was A Bandage Applied: Yes Biopsy Method: Personna blade Hemostasis: Aluminum Chloride Bill For Surgical Tray: no Dressing: no dressing applied Detail Level: Detailed Type Of Destruction Used: Curettage Electrodesiccation Text: The wound bed was treated with electrodesiccation after the biopsy was performed. Lab Facility: [facility] Electrodesiccation And Curettage Text: The wound bed was treated with electrodesiccation and curettage after the biopsy was performed. Path Notes (To The Dermatopathologist): 0.6 mm Anesthesia Type: 1% lidocaine without epinephrine and a 1:10 solution of 8.4% sodium bicarbonate Post-Care Instructions: Clean the wound with antibacterial soap and water twice a day. If crust develops, soak with moist gauze. Apply a thin layer of Vaseline to the area twice a day until wound is healed. Cover with a clean band-aid\\nBleeding is rare, but if it should occur, apply direct pressure to the bleeding site for a full 15 minutes without easing up. If the bleeding continues, despite your efforts, please call the office. If it is after hours, call [phone] to speak to a provider.   It may be necessary to go to the emergency room Billing Type: [employer] Wound Care: Vaseline Additional Anesthesia Volume In Cc (Will Not Render If 0): 0 Size Of Lesion In Cm: 0.6 Silver Nitrate Text: The wound bed was treated with silver nitrate after the biopsy was performed. Consent: The provider's intent is to obtain a tissue sample solely for diagnostic purposes. Written consent to obtain tissue sample was obtained and risks were reviewed including but not limited to scarring, infection, bleeding, scabbing, incomplete removal, nerve damage and allergy to anesthesia. Notification Instructions: Patient will be notified of biopsy results. However, patient instructed to call the office if not contacted within 2 weeks. Cryotherapy Text: The wound bed was treated with cryotherapy after the biopsy was performed. Biopsy Type: H and E Lab: [facility] Depth Of Biopsy: dermis Body Location Override (Optional - Billing Will Still Be Based On Selected Body Map Location If Applicable): left forearm Anesthesia Volume In Cc (Will Not Render If 0): 1.5 Lab: 249 Size Of Lesion In Cm: 0.4 Lab Facility: [id number] Consent: The provider's intent is to obtain a tissue sample solely for diagnostic purposes.  Written consent to obtain tissue sample was obtained and risks were reviewed including but not limited to scarring, infection, bleeding, scabbing, incomplete removal, nerve damage and allergy to anesthesia. Path Notes (To The Dermatopathologist): 0.4mm Post-Care Instructions: Clean the wound with antibacterial soap and water twice a day. If crust develops, soak with moist gauze. Apply a thin layer of Vaseline to the area twice a day until wound is healed. Cover with a clean band-aid\\nBleeding is rare, but if it should occur, apply direct pressure to the bleeding site for a full 15 minutes without easing up. If the bleeding continues, despite your efforts, please call the office. If it is after hours, call [phone] to speak to a provider.   It may be necessary to go to the emergency room Billing Type: Third-Party Bill 28-Aug-2024 Path Notes (To The Dermatopathologist): 1.2 cm Post-Care Instructions: Clean the wound with antibacterial soap and water twice a day. If crust develops, soak with moist gauze. Apply a thin layer of Vaseline to the area twice a day until wound is healed. Cover with a clean band-aid\\nBleeding is rare, but if it should occur, apply direct pressure to the bleeding site for a full 15 minutes without easing up. If the bleeding continues, despite your efforts, please call the office. If it is after hours, call [phone] to speak to a provider.   It may be necessary to go to the emergency room Body Location Override (Optional - Billing Will Still Be Based On Selected Body Map Location If Applicable): right chest Size Of Lesion In Cm: 1.2 Post-Care Instructions: Clean the wound with antibacterial soap and water twice a day. If crust develops, soak with moist gauze. Apply a thin layer of Vaseline to the area twice a day until wound is healed. Cover with a clean band-aid\\nBleeding is rare, but if it should occur, apply direct pressure to the bleeding site for a full 15 minutes without easing up. If the bleeding continues, despite your efforts, please call the office. If it is after hours, call [phone] to speak to a provider.   It may be necessary to go to the emergency room 30-Aug-2024

## 2024-08-28 LAB
BASOPHILS # BLD AUTO: 0.05 K/UL — SIGNIFICANT CHANGE UP (ref 0–0.2)
BASOPHILS NFR BLD AUTO: 0.3 % — SIGNIFICANT CHANGE UP (ref 0–2)
EOSINOPHIL # BLD AUTO: 0.08 K/UL — SIGNIFICANT CHANGE UP (ref 0–0.5)
EOSINOPHIL NFR BLD AUTO: 0.5 % — SIGNIFICANT CHANGE UP (ref 0–6)
HCT VFR BLD CALC: 27.3 % — LOW (ref 34.5–45)
HGB BLD-MCNC: 9.1 G/DL — LOW (ref 11.5–15.5)
IMM GRANULOCYTES NFR BLD AUTO: 0.9 % — SIGNIFICANT CHANGE UP (ref 0–0.9)
LYMPHOCYTES # BLD AUTO: 20.1 % — SIGNIFICANT CHANGE UP (ref 13–44)
LYMPHOCYTES # BLD AUTO: 3.17 K/UL — SIGNIFICANT CHANGE UP (ref 1–3.3)
MCHC RBC-ENTMCNC: 29.4 PG — SIGNIFICANT CHANGE UP (ref 27–34)
MCHC RBC-ENTMCNC: 33.3 GM/DL — SIGNIFICANT CHANGE UP (ref 32–36)
MCV RBC AUTO: 88.1 FL — SIGNIFICANT CHANGE UP (ref 80–100)
MONOCYTES # BLD AUTO: 1.29 K/UL — HIGH (ref 0–0.9)
MONOCYTES NFR BLD AUTO: 8.2 % — SIGNIFICANT CHANGE UP (ref 2–14)
NEUTROPHILS # BLD AUTO: 11.01 K/UL — HIGH (ref 1.8–7.4)
NEUTROPHILS NFR BLD AUTO: 70 % — SIGNIFICANT CHANGE UP (ref 43–77)
NRBC # BLD: 0 /100 WBCS — SIGNIFICANT CHANGE UP (ref 0–0)
PLATELET # BLD AUTO: 225 K/UL — SIGNIFICANT CHANGE UP (ref 150–400)
RBC # BLD: 3.1 M/UL — LOW (ref 3.8–5.2)
RBC # FLD: 13.7 % — SIGNIFICANT CHANGE UP (ref 10.3–14.5)
WBC # BLD: 15.74 K/UL — HIGH (ref 3.8–10.5)
WBC # FLD AUTO: 15.74 K/UL — HIGH (ref 3.8–10.5)

## 2024-08-28 RX ADMIN — ACETAMINOPHEN 975 MILLIGRAM(S): 325 TABLET ORAL at 05:21

## 2024-08-28 RX ADMIN — Medication 600 MILLIGRAM(S): at 20:48

## 2024-08-28 RX ADMIN — ACETAMINOPHEN 975 MILLIGRAM(S): 325 TABLET ORAL at 17:44

## 2024-08-28 RX ADMIN — SODIUM FERRIC GLUCONATE COMPLEX 108 MILLIGRAM(S): 12.5 INJECTION INTRAVENOUS at 14:55

## 2024-08-28 RX ADMIN — ACETAMINOPHEN 975 MILLIGRAM(S): 325 TABLET ORAL at 00:40

## 2024-08-28 RX ADMIN — Medication 600 MILLIGRAM(S): at 14:50

## 2024-08-28 RX ADMIN — ACETAMINOPHEN 975 MILLIGRAM(S): 325 TABLET ORAL at 11:52

## 2024-08-28 RX ADMIN — Medication 600 MILLIGRAM(S): at 09:35

## 2024-08-28 RX ADMIN — Medication 80 MILLIGRAM(S): at 09:35

## 2024-08-28 RX ADMIN — ACETAMINOPHEN 975 MILLIGRAM(S): 325 TABLET ORAL at 23:55

## 2024-08-28 RX ADMIN — ENOXAPARIN SODIUM 40 MILLIGRAM(S): 100 INJECTION SUBCUTANEOUS at 11:52

## 2024-08-28 RX ADMIN — Medication 1 APPLICATION(S): at 11:53

## 2024-08-28 RX ADMIN — Medication 30 MILLILITER(S): at 09:35

## 2024-08-29 RX ADMIN — Medication 600 MILLIGRAM(S): at 12:25

## 2024-08-29 RX ADMIN — ACETAMINOPHEN 975 MILLIGRAM(S): 325 TABLET ORAL at 09:21

## 2024-08-29 RX ADMIN — ACETAMINOPHEN 975 MILLIGRAM(S): 325 TABLET ORAL at 20:47

## 2024-08-29 RX ADMIN — ACETAMINOPHEN 975 MILLIGRAM(S): 325 TABLET ORAL at 15:19

## 2024-08-29 RX ADMIN — Medication 600 MILLIGRAM(S): at 23:35

## 2024-08-29 RX ADMIN — Medication 30 MILLILITER(S): at 09:24

## 2024-08-29 RX ADMIN — ENOXAPARIN SODIUM 40 MILLIGRAM(S): 100 INJECTION SUBCUTANEOUS at 12:25

## 2024-08-29 RX ADMIN — Medication 600 MILLIGRAM(S): at 04:09

## 2024-08-29 RX ADMIN — Medication 600 MILLIGRAM(S): at 18:47

## 2024-08-30 VITALS
TEMPERATURE: 98 F | DIASTOLIC BLOOD PRESSURE: 82 MMHG | HEART RATE: 72 BPM | RESPIRATION RATE: 18 BRPM | SYSTOLIC BLOOD PRESSURE: 128 MMHG | OXYGEN SATURATION: 98 %

## 2024-08-30 PROCEDURE — 86901 BLOOD TYPING SEROLOGIC RH(D): CPT

## 2024-08-30 PROCEDURE — 36415 COLL VENOUS BLD VENIPUNCTURE: CPT

## 2024-08-30 PROCEDURE — 85384 FIBRINOGEN ACTIVITY: CPT

## 2024-08-30 PROCEDURE — 86780 TREPONEMA PALLIDUM: CPT

## 2024-08-30 PROCEDURE — 80053 COMPREHEN METABOLIC PANEL: CPT

## 2024-08-30 PROCEDURE — 85025 COMPLETE CBC W/AUTO DIFF WBC: CPT

## 2024-08-30 PROCEDURE — 82570 ASSAY OF URINE CREATININE: CPT

## 2024-08-30 PROCEDURE — 84550 ASSAY OF BLOOD/URIC ACID: CPT

## 2024-08-30 PROCEDURE — 86850 RBC ANTIBODY SCREEN: CPT

## 2024-08-30 PROCEDURE — 59050 FETAL MONITOR W/REPORT: CPT

## 2024-08-30 PROCEDURE — 86762 RUBELLA ANTIBODY: CPT

## 2024-08-30 PROCEDURE — 86900 BLOOD TYPING SEROLOGIC ABO: CPT

## 2024-08-30 PROCEDURE — 84156 ASSAY OF PROTEIN URINE: CPT

## 2024-08-30 PROCEDURE — 83615 LACTATE (LD) (LDH) ENZYME: CPT

## 2024-08-30 RX ORDER — HYDROCORTISONE 1 %
1 OINTMENT (GRAM) TOPICAL EVERY 12 HOURS
Refills: 0 | Status: DISCONTINUED | OUTPATIENT
Start: 2024-08-30 | End: 2024-08-30

## 2024-08-30 RX ADMIN — Medication 600 MILLIGRAM(S): at 06:29

## 2024-08-30 RX ADMIN — ACETAMINOPHEN 975 MILLIGRAM(S): 325 TABLET ORAL at 14:34

## 2024-08-30 RX ADMIN — Medication 600 MILLIGRAM(S): at 12:22

## 2024-08-30 RX ADMIN — Medication 1 APPLICATION(S): at 07:02

## 2024-08-30 RX ADMIN — ACETAMINOPHEN 975 MILLIGRAM(S): 325 TABLET ORAL at 09:46

## 2024-08-30 NOTE — PROGRESS NOTE ADULT - SUBJECTIVE AND OBJECTIVE BOX
Patient evaluated at bedside this morning, resting comfortable in bed, with no acute events overnight.  She reports pain is well controlled with Tylenol and Toradol  She denies headache, dizziness, chest pain, palpitations, shortness of breathe, nausea, vomiting or heavy vaginal bleeding.  She has not tried ambulating since procedure, nix remains in place at this time. Tolerating PO.     Physical Exam:  Vital Signs Last 24 Hrs  T(C): 36.6 (27 Aug 2024 06:19), Max: 36.8 (27 Aug 2024 00:01)  T(F): 97.9 (27 Aug 2024 06:19), Max: 98.3 (27 Aug 2024 00:01)  HR: 67 (27 Aug 2024 06:19) (66 - 110)  BP: 116/70 (27 Aug 2024 06:19) (111/59 - 135/80)  BP(mean): --  RR: 18 (27 Aug 2024 06:19) (16 - 18)  SpO2: 97% (27 Aug 2024 06:19) (96% - 100%)    Parameters below as of 27 Aug 2024 00:01  Patient On (Oxygen Delivery Method): room air        GA: NAD, A+0 x 3  Pulm: comfortable on RA  Abd: + BS, soft, nontender, nondistended, no rebound or guarding, incision clean, dry and intact, uterus firm at midline, 2 fb below umbilicus  : nix in situ, lochia WNL  Extremities: no swelling or calf tenderness, reflexes +2 bilaterally, SCD in place                            12.3   20.02 )-----------( 268      ( 26 Aug 2024 17:40 )             36.2     08-26    135  |  102  |  6<L>  ----------------------------<  89  4.1   |  20<L>  |  0.50    Ca    9.1      26 Aug 2024 17:40    TPro  6.4  /  Alb  3.5  /  TBili  <0.2  /  DBili  x   /  AST  16  /  ALT  5<L>  /  AlkPhos  123<H>  08-26        acetaminophen     Tablet .. 975 milliGRAM(s) Oral <User Schedule>  dexAMETHasone  Injectable 4 milliGRAM(s) IV Push every 6 hours PRN  diphenhydrAMINE 25 milliGRAM(s) Oral every 6 hours PRN  diphtheria/tetanus/pertussis (acellular) Vaccine (Adacel) 0.5 milliLiter(s) IntraMuscular once  ibuprofen  Tablet. 600 milliGRAM(s) Oral every 6 hours  ketorolac   Injectable 30 milliGRAM(s) IV Push every 6 hours  lactated ringers. 1000 milliLiter(s) IV Continuous <Continuous>  lanolin Ointment 1 Application(s) Topical every 6 hours PRN  magnesium hydroxide Suspension 30 milliLiter(s) Oral two times a day PRN  naloxone Injectable 0.1 milliGRAM(s) IV Push every 3 minutes PRN  ondansetron Injectable 4 milliGRAM(s) IV Push every 6 hours PRN  oxyCODONE    IR 5 milliGRAM(s) Oral once PRN  oxyCODONE    IR 5 milliGRAM(s) Oral every 3 hours PRN  oxytocin Infusion 333.333 milliUNIT(s)/Min IV Continuous <Continuous>  simethicone 80 milliGRAM(s) Chew every 4 hours PRN  
Patient evaluated at bedside this morning, resting comfortable in bed.   She reports pain is well controlled with OPM  She denies headache, dizziness, chest pain, palpitations, shortness of breathe, nausea, vomiting or heavy vaginal bleeding.  She has been ambulating without assistance, voiding spontaneously, passing gas, tolerating regular diet.    Physical Exam:  Vital Signs Last 24 Hrs  T(C): 36.5 (29 Aug 2024 18:25), Max: 36.9 (29 Aug 2024 06:07)  T(F): 97.7 (29 Aug 2024 18:25), Max: 98.4 (29 Aug 2024 06:07)  HR: 81 (29 Aug 2024 18:25) (69 - 81)  BP: 125/78 (29 Aug 2024 18:25) (125/78 - 130/85)  BP(mean): --  RR: 18 (29 Aug 2024 18:25) (18 - 18)  SpO2: 97% (29 Aug 2024 18:25) (97% - 98%)    Parameters below as of 29 Aug 2024 09:50  Patient On (Oxygen Delivery Method): room air        GA: NAD, A+0 x 3  Pulm: comfotable on RA  Abd: + BS, soft, nontender, nondistended, no rebound or guarding, incision clean, dry and intact, uterus firm at midline,  2 fb below umbilicus  maculopapular rash in the upper abdomen and flanks  : lochia WNL  Extremities: no swelling or calf tenderness                   
Incomplete note    I saw the patient at bedside. She is a 35 yo , POD0 from pCS, c/b PPH and chorioamnionitis, DIC, now after Supracervical hysterectomy and b/l salpingectomy. Patient is admitted to the SICU and currently requires pressors support. She is awake, responsive, and still intubated.     
Patient evaluated at bedside this morning, resting comfortable in bed.   She reports pain is well controlled with OPM  She denies headache, dizziness, chest pain, palpitations, shortness of breathe, nausea, vomiting or heavy vaginal bleeding.  She has been ambulating without assistance, voiding spontaneously, passing gas, tolerating regular diet.    Physical Exam:  Vital Signs Last 24 Hrs  T(C): 36.6 (28 Aug 2024 06:10), Max: 36.7 (27 Aug 2024 09:20)  T(F): 97.9 (28 Aug 2024 06:10), Max: 98.1 (27 Aug 2024 13:49)  HR: 70 (28 Aug 2024 06:10) (69 - 76)  BP: 114/74 (28 Aug 2024 06:10) (106/60 - 124/82)  BP(mean): --  RR: 18 (28 Aug 2024 06:10) (18 - 18)  SpO2: 96% (28 Aug 2024 06:10) (95% - 96%)    Parameters below as of 27 Aug 2024 22:00  Patient On (Oxygen Delivery Method): room air        GA: NAD, A+0 x 3  Pulm: comfotable on RA  Abd: + BS, soft, nontender, nondistended, no rebound or guarding, incision clean, dry and intact, uterus firm at midline,  2 fb below umbilicus  : lochia WNL  Extremities: no swelling or calf tenderness                             9.1    15.74 )-----------( 225      ( 28 Aug 2024 05:30 )             27.3     08-26    135  |  102  |  6<L>  ----------------------------<  89  4.1   |  20<L>  |  0.50    Ca    9.1      26 Aug 2024 17:40    TPro  6.4  /  Alb  3.5  /  TBili  <0.2  /  DBili  x   /  AST  16  /  ALT  5<L>  /  AlkPhos  123<H>  08-26        
Patient evaluated at bedside this morning, resting comfortable in bed.   She reports pain is well controlled with OPM  She denies headache, dizziness, chest pain, palpitations, shortness of breathe, nausea, vomiting or heavy vaginal bleeding.  She has been ambulating without assistance, voiding spontaneously, passing gas, tolerating regular diet.    Physical Exam:  Vital Signs Last 24 Hrs  T(C): 36.6 (28 Aug 2024 22:10), Max: 36.8 (28 Aug 2024 14:51)  T(F): 97.8 (28 Aug 2024 22:10), Max: 98.2 (28 Aug 2024 14:51)  HR: 74 (28 Aug 2024 22:10) (69 - 74)  BP: 127/82 (28 Aug 2024 22:10) (114/74 - 133/76)  BP(mean): --  RR: 18 (28 Aug 2024 22:10) (18 - 18)  SpO2: 95% (28 Aug 2024 22:10) (95% - 96%)    Parameters below as of 28 Aug 2024 22:10  Patient On (Oxygen Delivery Method): room air        GA: NAD, A+0 x 3  Pulm: comfotable on RA  Abd: + BS, soft, nontender, nondistended, no rebound or guarding, incision clean, dry and intact, uterus firm at midline,  2 fb below umbilicus  : lochia WNL  Extremities: no swelling or calf tenderness                             9.1    15.74 )-----------( 225      ( 28 Aug 2024 05:30 )             27.3

## 2024-08-30 NOTE — PROGRESS NOTE ADULT - ASSESSMENT
A/P   34y  s/p primary  section for breech presentation after presenting in early labor, POD #1, stable  -  Pain: PO Tylenol, IV Toradol for 24 hours, then PO motrin, Oxycode for severe pain PRN  -  Post-operatively labs: post-op Hgb is pending , hemodynamically stable, no symptoms of anemia   -  GI: tolerating PO, not yet passing gas, ADAT  -  : nix in situ; DC this AM, f/u TOV  -  DVT prophylaxis: ambulation, SCDs, SQL  -  Dispo: POD 3 or 4
A/P: 34y s/p  section for breech presentation, POD#4, stable  -  Pain: PO motrin q6hrs, tylenol q8hrs, oxycodone for severe pain PRN  -  Post-operatively labs: post-op Hgb , hemodynamically stable, no symptoms of anemia   -  GI: tolerating regular diet, passing gas, simethicone PRN  -  : s/p nix , urinating without difficulty  -  DVT prophylaxis: encouraged increased ambulation, SCDs, SQL  -  Dispo: POD 4
A/P: 34y s/p  section for breech presentation, POD#2, stable  -  Pain: PO motrin q6hrs, tylenol q8hrs, oxycodone for severe pain PRN  -  Post-operatively labs: post-op Hgb , hemodynamically stable, no symptoms of anemia   -  GI: tolerating regular diet, passing gas, simethicone PRN  -  : s/p nix , urinating without difficulty  -  DVT prophylaxis: encouraged increased ambulation, SCDs, SQL  -  Dispo: POD 3 or 4
A/P: 34y s/p  section for breech presentation, POD#3, stable  -  Pain: PO motrin q6hrs, tylenol q8hrs, oxycodone for severe pain PRN  -  Post-operatively labs: post-op Hgb , hemodynamically stable, no symptoms of anemia   -  GI: tolerating regular diet, passing gas, simethicone PRN  -  : s/p nix , urinating without difficulty  -  DVT prophylaxis: encouraged increased ambulation, SCDs, SQL  -  Dispo: POD 3 or 4

## 2024-09-03 PROBLEM — Q87.81 ALPORT SYNDROME: Chronic | Status: ACTIVE | Noted: 2024-08-26

## 2024-09-04 ENCOUNTER — APPOINTMENT (OUTPATIENT)
Age: 34
End: 2024-09-04
Payer: COMMERCIAL

## 2024-09-04 PROCEDURE — S9443: CPT | Mod: 95

## 2024-09-05 ENCOUNTER — TRANSCRIPTION ENCOUNTER (OUTPATIENT)
Age: 34
End: 2024-09-05

## 2024-09-05 DIAGNOSIS — D62 ACUTE POSTHEMORRHAGIC ANEMIA: ICD-10-CM

## 2024-09-05 DIAGNOSIS — E06.3 AUTOIMMUNE THYROIDITIS: ICD-10-CM

## 2024-09-05 DIAGNOSIS — Q87.81 ALPORT SYNDROME: ICD-10-CM

## 2024-09-05 DIAGNOSIS — Z3A.39 39 WEEKS GESTATION OF PREGNANCY: ICD-10-CM

## 2024-09-05 DIAGNOSIS — Z28.09 IMMUNIZATION NOT CARRIED OUT BECAUSE OF OTHER CONTRAINDICATION: ICD-10-CM

## 2024-09-05 DIAGNOSIS — Z28.21 IMMUNIZATION NOT CARRIED OUT BECAUSE OF PATIENT REFUSAL: ICD-10-CM

## 2024-09-09 ENCOUNTER — APPOINTMENT (OUTPATIENT)
Dept: OBGYN | Facility: CLINIC | Age: 34
End: 2024-09-09
Payer: COMMERCIAL

## 2024-09-09 VITALS
DIASTOLIC BLOOD PRESSURE: 78 MMHG | HEIGHT: 62 IN | WEIGHT: 156 LBS | SYSTOLIC BLOOD PRESSURE: 121 MMHG | OXYGEN SATURATION: 98 % | BODY MASS INDEX: 28.71 KG/M2 | HEART RATE: 73 BPM

## 2024-09-09 PROCEDURE — 0503F POSTPARTUM CARE VISIT: CPT

## 2024-09-09 NOTE — HISTORY OF PRESENT ILLNESS
[Delivery Date: ___] : on [unfilled] [Primary C/S] : delivered by  section [Female] : Delivery History: baby girl [Wt. ___] : weighing [unfilled] [Breastfeeding] : currently nursing [Abdominal Pain] : abdominal pain [Fatigue] : fatigue [Dysuria] : dysuria [Headache] : headache [Complications:___] : no complications [Breast Pain] : no breast pain [Chest Pain] : no chest pain [Cracked Nipples] : no cracked nipples [Chills] : no chills [Fever] : no fever [Nausea] : no nausea [Vomiting] : no vomiting

## 2024-09-24 ENCOUNTER — TRANSCRIPTION ENCOUNTER (OUTPATIENT)
Age: 34
End: 2024-09-24

## 2024-09-25 ENCOUNTER — OUTPATIENT (OUTPATIENT)
Dept: OUTPATIENT SERVICES | Facility: HOSPITAL | Age: 34
LOS: 1 days | End: 2024-09-25
Payer: COMMERCIAL

## 2024-09-25 ENCOUNTER — APPOINTMENT (OUTPATIENT)
Dept: OBGYN | Facility: CLINIC | Age: 34
End: 2024-09-25

## 2024-09-25 VITALS
OXYGEN SATURATION: 99 % | WEIGHT: 156 LBS | DIASTOLIC BLOOD PRESSURE: 82 MMHG | SYSTOLIC BLOOD PRESSURE: 118 MMHG | HEART RATE: 68 BPM | BODY MASS INDEX: 28.53 KG/M2

## 2024-09-25 DIAGNOSIS — Z87.59 PERSONAL HISTORY OF OTHER COMPLICATIONS OF PREGNANCY, CHILDBIRTH AND THE PUERPERIUM: ICD-10-CM

## 2024-09-25 DIAGNOSIS — N93.9 ABNORMAL UTERINE AND VAGINAL BLEEDING, UNSPECIFIED: ICD-10-CM

## 2024-09-25 DIAGNOSIS — Z00.8 ENCOUNTER FOR OTHER GENERAL EXAMINATION: ICD-10-CM

## 2024-09-25 DIAGNOSIS — Z98.891 HISTORY OF UTERINE SCAR FROM PREVIOUS SURGERY: ICD-10-CM

## 2024-09-25 PROCEDURE — 76830 TRANSVAGINAL US NON-OB: CPT

## 2024-09-25 PROCEDURE — 76830 TRANSVAGINAL US NON-OB: CPT | Mod: 26

## 2024-09-25 PROCEDURE — 99213 OFFICE O/P EST LOW 20 MIN: CPT | Mod: 25

## 2024-09-26 DIAGNOSIS — N93.9 ABNORMAL UTERINE AND VAGINAL BLEEDING, UNSPECIFIED: ICD-10-CM

## 2024-09-30 ENCOUNTER — TRANSCRIPTION ENCOUNTER (OUTPATIENT)
Age: 34
End: 2024-09-30

## 2024-10-03 LAB
ALBUMIN SERPL ELPH-MCNC: 3.9 G/DL
ALP BLD-CCNC: 71 U/L
ALT SERPL-CCNC: 8 U/L
ANION GAP SERPL CALC-SCNC: 17 MMOL/L
AST SERPL-CCNC: 16 U/L
BASOPHILS # BLD AUTO: 0.07 K/UL
BASOPHILS NFR BLD AUTO: 0.8 %
BILIRUB SERPL-MCNC: 0.2 MG/DL
BUN SERPL-MCNC: 11 MG/DL
CALCIUM SERPL-MCNC: 9.1 MG/DL
CHLORIDE SERPL-SCNC: 102 MMOL/L
CO2 SERPL-SCNC: 22 MMOL/L
CREAT SERPL-MCNC: 0.51 MG/DL
EGFR: 127 ML/MIN/1.73M2
EOSINOPHIL # BLD AUTO: 0.3 K/UL
EOSINOPHIL NFR BLD AUTO: 3.3 %
GLUCOSE SERPL-MCNC: 92 MG/DL
HCG SERPL-MCNC: 2 MIU/ML
HCT VFR BLD CALC: 37.8 %
HGB BLD-MCNC: 12 G/DL
IMM GRANULOCYTES NFR BLD AUTO: 0.2 %
LYMPHOCYTES # BLD AUTO: 3.4 K/UL
LYMPHOCYTES NFR BLD AUTO: 37.6 %
MAN DIFF?: NORMAL
MCHC RBC-ENTMCNC: 29.4 PG
MCHC RBC-ENTMCNC: 31.7 GM/DL
MCV RBC AUTO: 92.6 FL
MONOCYTES # BLD AUTO: 0.76 K/UL
MONOCYTES NFR BLD AUTO: 8.4 %
NEUTROPHILS # BLD AUTO: 4.49 K/UL
NEUTROPHILS NFR BLD AUTO: 49.7 %
PLATELET # BLD AUTO: 279 K/UL
POTASSIUM SERPL-SCNC: 3.9 MMOL/L
PROT SERPL-MCNC: 6.2 G/DL
RBC # BLD: 4.08 M/UL
RBC # FLD: 12.8 %
SODIUM SERPL-SCNC: 141 MMOL/L
WBC # FLD AUTO: 9.04 K/UL

## 2024-10-04 ENCOUNTER — APPOINTMENT (OUTPATIENT)
Dept: OBGYN | Facility: CLINIC | Age: 34
End: 2024-10-04

## 2024-10-04 ENCOUNTER — LABORATORY RESULT (OUTPATIENT)
Age: 34
End: 2024-10-04

## 2024-10-04 VITALS
OXYGEN SATURATION: 98 % | BODY MASS INDEX: 28.35 KG/M2 | WEIGHT: 155 LBS | SYSTOLIC BLOOD PRESSURE: 112 MMHG | HEART RATE: 74 BPM | DIASTOLIC BLOOD PRESSURE: 79 MMHG

## 2024-10-04 DIAGNOSIS — R53.83 OTHER FATIGUE: ICD-10-CM

## 2024-10-04 PROCEDURE — 0503F POSTPARTUM CARE VISIT: CPT

## 2024-10-10 LAB
APPEARANCE: CLEAR
BILIRUBIN URINE: NEGATIVE
BLOOD URINE: ABNORMAL
COLOR: YELLOW
GLUCOSE QUALITATIVE U: NEGATIVE MG/DL
KETONES URINE: NEGATIVE MG/DL
LEUKOCYTE ESTERASE URINE: NEGATIVE
NITRITE URINE: NEGATIVE
PH URINE: 5.5
PROTEIN URINE: 100 MG/DL
SPECIFIC GRAVITY URINE: 1.02
TSH SERPL-ACNC: 0.69 UIU/ML
UROBILINOGEN URINE: 0.2 MG/DL

## 2024-10-23 ENCOUNTER — APPOINTMENT (OUTPATIENT)
Dept: OBGYN | Facility: CLINIC | Age: 34
End: 2024-10-23

## 2024-10-23 PROCEDURE — 76830 TRANSVAGINAL US NON-OB: CPT

## 2024-10-23 PROCEDURE — 0503F POSTPARTUM CARE VISIT: CPT

## 2024-10-27 LAB — ESTRADIOL SERPL-MCNC: 10 PG/ML

## 2024-11-04 ENCOUNTER — APPOINTMENT (OUTPATIENT)
Age: 34
End: 2024-11-04
Payer: COMMERCIAL

## 2024-11-04 ENCOUNTER — APPOINTMENT (OUTPATIENT)
Age: 34
End: 2024-11-04

## 2024-11-04 PROCEDURE — S9443: CPT | Mod: 95

## 2025-04-16 ENCOUNTER — LABORATORY RESULT (OUTPATIENT)
Age: 35
End: 2025-04-16

## 2025-04-16 ENCOUNTER — APPOINTMENT (OUTPATIENT)
Dept: OBGYN | Facility: CLINIC | Age: 35
End: 2025-04-16
Payer: COMMERCIAL

## 2025-04-16 VITALS
BODY MASS INDEX: 28.35 KG/M2 | SYSTOLIC BLOOD PRESSURE: 121 MMHG | WEIGHT: 155 LBS | HEART RATE: 71 BPM | OXYGEN SATURATION: 97 % | DIASTOLIC BLOOD PRESSURE: 77 MMHG

## 2025-04-16 DIAGNOSIS — Z01.419 ENCOUNTER FOR GYNECOLOGICAL EXAMINATION (GENERAL) (ROUTINE) W/OUT ABNORMAL FINDINGS: ICD-10-CM

## 2025-04-16 DIAGNOSIS — N28.9 DISORDER OF KIDNEY AND URETER, UNSPECIFIED: ICD-10-CM

## 2025-04-16 PROCEDURE — 99395 PREV VISIT EST AGE 18-39: CPT

## 2025-04-21 ENCOUNTER — TRANSCRIPTION ENCOUNTER (OUTPATIENT)
Age: 35
End: 2025-04-21

## 2025-04-21 LAB
ALBUMIN SERPL ELPH-MCNC: 4.7 G/DL
ALP BLD-CCNC: 66 U/L
ALT SERPL-CCNC: 8 U/L
ANION GAP SERPL CALC-SCNC: 13 MMOL/L
APPEARANCE: CLEAR
AST SERPL-CCNC: 15 U/L
BASOPHILS # BLD AUTO: 0.07 K/UL
BASOPHILS NFR BLD AUTO: 0.7 %
BILIRUB SERPL-MCNC: 0.2 MG/DL
BILIRUBIN URINE: NEGATIVE
BLOOD URINE: ABNORMAL
BUN SERPL-MCNC: 14 MG/DL
CALCIUM SERPL-MCNC: 9.7 MG/DL
CHLORIDE SERPL-SCNC: 107 MMOL/L
CHOLEST SERPL-MCNC: 274 MG/DL
CO2 SERPL-SCNC: 21 MMOL/L
COLOR: YELLOW
CREAT SERPL-MCNC: 0.47 MG/DL
CREAT SPEC-SCNC: 78 MG/DL
CREAT/PROT UR: 1.2 RATIO
CYTOLOGY CVX/VAG DOC THIN PREP: NORMAL
EGFRCR SERPLBLD CKD-EPI 2021: 130 ML/MIN/1.73M2
EOSINOPHIL # BLD AUTO: 0.14 K/UL
EOSINOPHIL NFR BLD AUTO: 1.5 %
ESTRADIOL SERPL-MCNC: 116 PG/ML
GLUCOSE QUALITATIVE U: NEGATIVE MG/DL
GLUCOSE SERPL-MCNC: 89 MG/DL
HCT VFR BLD CALC: 38.5 %
HDLC SERPL-MCNC: 88 MG/DL
HGB BLD-MCNC: 12.7 G/DL
HPV HIGH+LOW RISK DNA PNL CVX: NOT DETECTED
IMM GRANULOCYTES NFR BLD AUTO: 0.2 %
KETONES URINE: NEGATIVE MG/DL
LDLC SERPL-MCNC: 168 MG/DL
LEUKOCYTE ESTERASE URINE: NEGATIVE
LYMPHOCYTES # BLD AUTO: 3.23 K/UL
LYMPHOCYTES NFR BLD AUTO: 33.6 %
MAN DIFF?: NORMAL
MCHC RBC-ENTMCNC: 28.6 PG
MCHC RBC-ENTMCNC: 33 G/DL
MCV RBC AUTO: 86.7 FL
MONOCYTES # BLD AUTO: 0.9 K/UL
MONOCYTES NFR BLD AUTO: 9.4 %
NEUTROPHILS # BLD AUTO: 5.24 K/UL
NEUTROPHILS NFR BLD AUTO: 54.6 %
NITRITE URINE: NEGATIVE
NONHDLC SERPL-MCNC: 186 MG/DL
PH URINE: 6
PLATELET # BLD AUTO: 302 K/UL
POTASSIUM SERPL-SCNC: 4.2 MMOL/L
PROT SERPL-MCNC: 7 G/DL
PROT UR-MCNC: 93 MG/DL
PROTEIN URINE: 100 MG/DL
RBC # BLD: 4.44 M/UL
RBC # FLD: 13.1 %
SODIUM SERPL-SCNC: 141 MMOL/L
SPECIFIC GRAVITY URINE: 1.02
TRIGL SERPL-MCNC: 105 MG/DL
TSH SERPL-ACNC: 2.42 UIU/ML
UROBILINOGEN URINE: 0.2 MG/DL
WBC # FLD AUTO: 9.6 K/UL
